# Patient Record
Sex: MALE | Race: BLACK OR AFRICAN AMERICAN | NOT HISPANIC OR LATINO | Employment: UNEMPLOYED | URBAN - METROPOLITAN AREA
[De-identification: names, ages, dates, MRNs, and addresses within clinical notes are randomized per-mention and may not be internally consistent; named-entity substitution may affect disease eponyms.]

---

## 2017-03-26 ENCOUNTER — HOSPITAL ENCOUNTER (EMERGENCY)
Facility: CLINIC | Age: 27
Discharge: HOME | End: 2017-03-26
Attending: EMERGENCY MEDICINE | Admitting: EMERGENCY MEDICINE

## 2017-03-26 VITALS
DIASTOLIC BLOOD PRESSURE: 93 MMHG | OXYGEN SATURATION: 99 % | SYSTOLIC BLOOD PRESSURE: 141 MMHG | RESPIRATION RATE: 16 BRPM | TEMPERATURE: 98.5 F | HEART RATE: 85 BPM

## 2017-03-26 DIAGNOSIS — R11.2 NAUSEA AND VOMITING, INTRACTABILITY OF VOMITING NOT SPECIFIED, UNSPECIFIED VOMITING TYPE: ICD-10-CM

## 2017-03-26 DIAGNOSIS — K29.70 GASTRITIS WITHOUT BLEEDING, UNSPECIFIED CHRONICITY, UNSPECIFIED GASTRITIS TYPE: ICD-10-CM

## 2017-03-26 LAB
ALBUMIN SERPL-MCNC: 4.6 G/DL (ref 3.4–5)
ALBUMIN UR-MCNC: NEGATIVE MG/DL
ALP SERPL-CCNC: 71 U/L (ref 40–150)
ALT SERPL W P-5'-P-CCNC: 58 U/L (ref 0–70)
ANION GAP SERPL CALCULATED.3IONS-SCNC: 12 MMOL/L (ref 3–14)
APPEARANCE UR: CLEAR
AST SERPL W P-5'-P-CCNC: 20 U/L (ref 0–45)
BASOPHILS # BLD AUTO: 0 10E9/L (ref 0–0.2)
BASOPHILS NFR BLD AUTO: 0.4 %
BILIRUB SERPL-MCNC: 0.8 MG/DL (ref 0.2–1.3)
BILIRUB UR QL STRIP: NEGATIVE
BUN SERPL-MCNC: 15 MG/DL (ref 7–30)
CALCIUM SERPL-MCNC: 9.3 MG/DL (ref 8.5–10.1)
CHLORIDE SERPL-SCNC: 105 MMOL/L (ref 94–109)
CO2 SERPL-SCNC: 24 MMOL/L (ref 20–32)
COLOR UR AUTO: YELLOW
CREAT SERPL-MCNC: 0.76 MG/DL (ref 0.66–1.25)
DIFFERENTIAL METHOD BLD: ABNORMAL
EOSINOPHIL # BLD AUTO: 0 10E9/L (ref 0–0.7)
EOSINOPHIL NFR BLD AUTO: 0.7 %
ERYTHROCYTE [DISTWIDTH] IN BLOOD BY AUTOMATED COUNT: 12 % (ref 10–15)
GFR SERPL CREATININE-BSD FRML MDRD: ABNORMAL ML/MIN/1.7M2
GLUCOSE SERPL-MCNC: 116 MG/DL (ref 70–99)
GLUCOSE UR STRIP-MCNC: NEGATIVE MG/DL
HCT VFR BLD AUTO: 49.4 % (ref 40–53)
HGB BLD-MCNC: 17.5 G/DL (ref 13.3–17.7)
HGB UR QL STRIP: NEGATIVE
HYALINE CASTS #/AREA URNS LPF: 1 /LPF (ref 0–2)
IMM GRANULOCYTES # BLD: 0 10E9/L (ref 0–0.4)
IMM GRANULOCYTES NFR BLD: 0.4 %
KETONES UR STRIP-MCNC: NEGATIVE MG/DL
LEUKOCYTE ESTERASE UR QL STRIP: NEGATIVE
LIPASE SERPL-CCNC: 208 U/L (ref 73–393)
LYMPHOCYTES # BLD AUTO: 2 10E9/L (ref 0.8–5.3)
LYMPHOCYTES NFR BLD AUTO: 36.2 %
MCH RBC QN AUTO: 30 PG (ref 26.5–33)
MCHC RBC AUTO-ENTMCNC: 35.4 G/DL (ref 31.5–36.5)
MCV RBC AUTO: 85 FL (ref 78–100)
MONOCYTES # BLD AUTO: 0.3 10E9/L (ref 0–1.3)
MONOCYTES NFR BLD AUTO: 5.9 %
MUCOUS THREADS #/AREA URNS LPF: PRESENT /LPF
NEUTROPHILS # BLD AUTO: 3.1 10E9/L (ref 1.6–8.3)
NEUTROPHILS NFR BLD AUTO: 56.4 %
NITRATE UR QL: NEGATIVE
NRBC # BLD AUTO: 0 10*3/UL
NRBC BLD AUTO-RTO: 0 /100
PH UR STRIP: 5 PH (ref 5–7)
PLATELET # BLD AUTO: 97 10E9/L (ref 150–450)
POTASSIUM SERPL-SCNC: 3.5 MMOL/L (ref 3.4–5.3)
PROT SERPL-MCNC: 8.6 G/DL (ref 6.8–8.8)
RBC # BLD AUTO: 5.84 10E12/L (ref 4.4–5.9)
RBC #/AREA URNS AUTO: 1 /HPF (ref 0–2)
SODIUM SERPL-SCNC: 141 MMOL/L (ref 133–144)
SP GR UR STRIP: 1.02 (ref 1–1.03)
URN SPEC COLLECT METH UR: ABNORMAL
UROBILINOGEN UR STRIP-MCNC: 0 MG/DL (ref 0–2)
WBC # BLD AUTO: 5.6 10E9/L (ref 4–11)
WBC #/AREA URNS AUTO: 1 /HPF (ref 0–2)

## 2017-03-26 PROCEDURE — 85025 COMPLETE CBC W/AUTO DIFF WBC: CPT | Performed by: EMERGENCY MEDICINE

## 2017-03-26 PROCEDURE — 83690 ASSAY OF LIPASE: CPT | Performed by: EMERGENCY MEDICINE

## 2017-03-26 PROCEDURE — S0028 INJECTION, FAMOTIDINE, 20 MG: HCPCS | Performed by: EMERGENCY MEDICINE

## 2017-03-26 PROCEDURE — 80053 COMPREHEN METABOLIC PANEL: CPT | Performed by: EMERGENCY MEDICINE

## 2017-03-26 PROCEDURE — 99284 EMERGENCY DEPT VISIT MOD MDM: CPT | Mod: 25

## 2017-03-26 PROCEDURE — 96361 HYDRATE IV INFUSION ADD-ON: CPT

## 2017-03-26 PROCEDURE — 25000125 ZZHC RX 250: Performed by: EMERGENCY MEDICINE

## 2017-03-26 PROCEDURE — 96374 THER/PROPH/DIAG INJ IV PUSH: CPT

## 2017-03-26 PROCEDURE — 81001 URINALYSIS AUTO W/SCOPE: CPT | Performed by: EMERGENCY MEDICINE

## 2017-03-26 PROCEDURE — 25000132 ZZH RX MED GY IP 250 OP 250 PS 637: Performed by: EMERGENCY MEDICINE

## 2017-03-26 PROCEDURE — 25000128 H RX IP 250 OP 636: Performed by: EMERGENCY MEDICINE

## 2017-03-26 RX ADMIN — FAMOTIDINE 20 MG: 10 INJECTION, SOLUTION INTRAVENOUS at 17:05

## 2017-03-26 RX ADMIN — SODIUM CHLORIDE 500 ML: 9 INJECTION, SOLUTION INTRAVENOUS at 17:05

## 2017-03-26 RX ADMIN — LIDOCAINE HYDROCHLORIDE 30 ML: 20 SOLUTION ORAL; TOPICAL at 17:02

## 2017-03-26 ASSESSMENT — ENCOUNTER SYMPTOMS
FEVER: 0
ABDOMINAL PAIN: 1
BLOOD IN STOOL: 0
VOMITING: 1
NAUSEA: 1
DYSURIA: 0
DIARRHEA: 0
SHORTNESS OF BREATH: 0

## 2017-03-26 NOTE — ED PROVIDER NOTES
"  History     Chief Complaint:  Nausea & Vomiting    HPI   Bridget Carmichael is a 26 year old male who presents with nausea, vomiting and abdominal pain. He states that over the weekend he experienced some upper abdominal discomfort accompanied with nausea. He vomited once today, though there was not any vomiting previous to this. There has been some decreased appetite, along with a mild headache that was alleviated with ibuprofen. There has not been any issues with fevers, blood in stool or vomit, nor any issues with urination. To note, he started that his symptoms began yesterday afternoon after eating lunch at his aunt's house.   Abdominal pain is \"slight\" and located in epigastric area.  No hematuria.    Allergies:  No Known Drug Allergies      Medications:    potassium chloride (K-DUR) 10 MEQ tablet   diphenoxylate-atropine (LOMOTIL) 2.5-0.025 MG per tablet     Past Medical History:    The patient denies any relevant past medical history.     Past Surgical History:    History reviewed. No pertinent past surgical history.     Family History:    The patient denies any relevant family medical history.     Social History:  The patient presented to the ED alone.   Smoking Status: N/A  Smokeless Tobacco: N/A  Alcohol Use: N/A   Marital Status:  Single [1]     Review of Systems   Constitutional: Negative for fever.   Respiratory: Negative for shortness of breath.    Gastrointestinal: Positive for abdominal pain, nausea and vomiting. Negative for blood in stool and diarrhea.   Genitourinary: Negative for dysuria.   All other systems reviewed and are negative.  Pt having nausea and abd discomfort over the weekend. Pt then vomited once today. Denies diarrhea.    Physical Exam   Vitals:  Patient Vitals for the past 24 hrs:   BP Temp Temp src Pulse Resp SpO2   03/26/17 1841 - - - - 16 -   03/26/17 1830 (!) 141/93 - - - - 99 %   03/26/17 1819 - - - - - 100 %   03/26/17 1818 (!) 144/92 - - - - -   03/26/17 1730 136/88 - - - - " 98 %   03/26/17 1715 - - - - - 99 %   03/26/17 1711 141/90 - - - - 99 %   03/26/17 1702 - 98.5  F (36.9  C) Oral - - -   03/26/17 1700 - - - - - 98 %   03/26/17 1634 (!) 165/112 - Oral 85 18 100 %     Physical Exam   Abdominal:         GEN: patient appears tired  HEAD: atraumatic, normocephalic  EYES: pupils reactive (3plus to 2plus), extraocular muscles intact, conjunctivae normal  ENT: TMs flat and white bilaterally, oropharynx normal with no erythema or exudate, mucus membranes moist  NECK: no cervical LAD, no meningeal signs  RESPIRATORY: no tachypnea, breath sounds clear to auscultation (no rales, wheezes, rhonchi), chest wall nontender, normal phonation  CVS: normal S1/S2, no murmurs/rubs/gallops  ABDOMEN: soft, slight tenderness epigastric area, no masses or organomegaly, no rebound, decreased bowel sounds  BACK: no CVAT  EXTREMITIES: intact pulses x 2 (radial pulses intact), no edema  MUSCULOSKELETAL: no deformities  SKIN: warm and dry  NEURO: Alert.  Motor- moves all 4 extremities.  Sensation- intact  Reflexes- DTRs 2plus.  Coordination-ambulatory.  Overall symmetrical exam  HEME: no bruising or petechiae/contusions  LYMPH: no lymphadenopathy    Emergency Department Course     Laboratory:  Laboratory findings were communicated with the patient who voiced understanding of the findings.  CBC: PLT: 97 (H) o/w WNL. (WBC 5.6, HGB 17.5)   CMP: Glucose: 116 (H) o/w WNL (Creatinine 0.76)  Lipase: 208 (normal)  UA: Mucous: Present (A) o/w normal     Interventions:  1702 GI Cocktail (Maalox/Mylanta and viscous Lidocaine), 30 mL suspension, PO    1705 Pepcid 20 mg IV  1705 Normal Saline 500 mL IV   Heplock  Cardiac/Sp02 monitoring    Emergency Department Course:  Nursing notes and vitals reviewed.  I performed an exam of the patient as documented above.  IV was inserted and blood was drawn for laboratory testing, results above.   The patient provided a urine sample here in the emergency department. This was sent for  laboratory testing, findings above.   1810 The patient was updated on the results of their lab tests. The patient states that they are feeling improved.     6:24 PM recheck, repeat ab exam soft    I discussed the treatment plan with the patient. They expressed understanding of this plan and consented to discharge. They will be discharged home with instructions for care and follow up. In addition, the patient will return to the emergency department if their symptoms persist, worsen, if new symptoms arise or if there is any concern.  All questions were answered.    I personally reviewed the laboratory results with the Patient and answered all related questions prior to discharge.    Impression & Plan      Medical Decision Making:  Bridget Carmichael is a 26 year old male who presents to the emergency department today with abdominal pain. He states that after he ate lunch yesterday he had some epigastric pain and diarrhea. On exam, he did not have peritoneal signs. Labs were sent and he was given antiacid through the IV in addition to fluids and nausea medications. He currently is feeling much better. His UA foes not show any sign of infection. His CBC is normal. Lipase does not show evidence of pancreatitis. Patient feels better and he tolerated PO. We will send him home with antiacids and he should follow up with primary care.   Repeat abdominal exam is soft and patient is tolerating a PO challenge.    Diagnosis:    ICD-10-CM    1. Nausea and vomiting, intractability of vomiting not specified, unspecified vomiting type R11.2    2. Gastritis without bleeding, unspecified chronicity, unspecified gastritis type K29.70       Disposition:   Discharge to home    Discharge Medications:  Discharge Medication List as of 3/26/2017  6:27 PM      START taking these medications    Details   ranitidine (ZANTAC) 300 MG tablet Take 0.5 tablets (150 mg) by mouth At Bedtime, Disp-30 tablet, R-0, Local Print           Instructions to  patient:  Sit up after eating.  [place] diet.   Avoid spicy or fatty foods.  Avoid smoking or carbonated beverages.  Use the ant-acid medication every day.  Maalox or pepto bismol for break through pain/gastritis.  Followup with your doctor in 1-2days.    Scribe Disclosure:  I, Tyrone Stallings, am serving as a scribe at 4:33 PM on 3/26/2017 to document services personally performed by Geovanna Pinto MD, based on my observations and the provider's statements to me.   3/26/2017   [facility] EMERGENCY DEPARTMENT       Geovanna Pinto MD  03/27/17 7540

## 2017-03-26 NOTE — ED NOTES
Pt having nausea and abd discomfort over the weekend. Pt then vomited once today. Denies diarrhea.

## 2017-03-26 NOTE — ED AVS SNAPSHOT
[facility] Emergency Department    [address]    [facility] [address]    Phone:  [phone]    Fax:  [phone]                                       Bridget Carmichael   MRN: [id number]    Department:  [facility] Emergency Department   Date of Visit:  3/26/2017           After Visit Summary Signature Page     I have received my discharge instructions, and my questions have been answered. I have discussed any challenges I see with this plan with the nurse or doctor.    ..........................................................................................................................................  Patient/Patient Representative Signature      ..........................................................................................................................................  Patient Representative Print Name and Relationship to Patient    ..................................................               ................................................  Date                                            Time    ..........................................................................................................................................  Reviewed by Signature/Title    ...................................................              ..............................................  Date                                                            Time

## 2017-03-26 NOTE — LETTER
[facility] EMERGENCY DEPARTMENT  [address] [place] [address]  [phone]    Bridget Carmichael  [address] [facility] [address] [facility] [address]  [phone] (home) none (work)    : 1990      To Whom it may concern:    Bridget Carmichael was seen in our Emergency Department today, 2017, for an illness he has had for the past few days.  We expect his condition to improve over the next 1-2 days.  He may return to work/school when improved.    Sincerely,        Colette Gregorio RN

## 2017-03-26 NOTE — ED AVS SNAPSHOT
[facility] Emergency Department    [address]    [facility] [address]    Phone:  [phone]    Fax:  [phone]                                       Bridget Carmichael   MRN: [id number]    Department:  [facility] Emergency Department   Date of Visit:  3/26/2017           Patient Information     Date Of Birth          1990        Your diagnoses for this visit were:     Nausea and vomiting, intractability of vomiting not specified, unspecified vomiting type     Gastritis without bleeding, unspecified chronicity, unspecified gastritis type        You were seen by Geovanna Pinto MD.      Follow-up Information     Follow up with [facility].    Contact information:    [address] [facility] [address] [phone]          Discharge Instructions       Sit up after eating.  [place] diet.   Avoid spicy or fatty foods.  Avoid smoking or carbonated beverages.  Use the ant-acid medication every day.  Maalox or pepto bismol for break through pain/gastritis.  Followup with your doctor in 1-2days.      Discharge References/Attachments     GASTRITIS (ADULT) (ENGLISH)      24 Hour Appointment Hotline       To make an appointment at any [facility] clinic, call [phone] ([phone]). If you don't have a family doctor or clinic, we will help you find one. [facility] clinics are conveniently located to serve the needs of you and your family.             Review of your medicines      START taking        Dose / Directions Last dose taken    ranitidine 300 MG tablet   Commonly known as:  ZANTAC   Dose:  150 mg   Quantity:  30 tablet        Take 0.5 tablets (150 mg) by mouth At Bedtime   Refills:  0                Prescriptions were sent or printed at these locations (1 Prescription)                   Other Prescriptions                Printed at Department/Unit printer (1 of 1)         ranitidine (ZANTAC) 300 MG tablet                Procedures and tests  performed during your visit     CBC with platelets differential    Comprehensive metabolic panel    Lipase    Peripheral IV catheter    UA with Microscopic    Vital signs      Orders Needing Specimen Collection     None      Pending Results     No orders found from 3/24/2017 to 3/27/2017.            Pending Culture Results     No orders found from 3/24/2017 to 3/27/2017.             Test Results from your hospital stay     3/26/2017  4:56 PM - Interface, Flexilab Results      Component Results     Component Value Ref Range & Units Status    WBC 5.6 4.0 - 11.0 10e9/L Final    RBC Count 5.84 4.4 - 5.9 10e12/L Final    Hemoglobin 17.5 13.3 - 17.7 g/dL Final    Hematocrit 49.4 40.0 - 53.0 % Final    MCV 85 78 - 100 fl Final    MCH 30.0 26.5 - 33.0 pg Final    MCHC 35.4 31.5 - 36.5 g/dL Final    RDW 12.0 10.0 - 15.0 % Final    Platelet Count 97 (L) 150 - 450 10e9/L Final    Diff Method Automated Method  Final    % Neutrophils 56.4 % Final    % Lymphocytes 36.2 % Final    % Monocytes 5.9 % Final    % Eosinophils 0.7 % Final    % Basophils 0.4 % Final    % Immature Granulocytes 0.4 % Final    Nucleated RBCs 0 0 /100 Final    Absolute Neutrophil 3.1 1.6 - 8.3 10e9/L Final    Absolute Lymphocytes 2.0 0.8 - 5.3 10e9/L Final    Absolute Monocytes 0.3 0.0 - 1.3 10e9/L Final    Absolute Eosinophils 0.0 0.0 - 0.7 10e9/L Final    Absolute Basophils 0.0 0.0 - 0.2 10e9/L Final    Abs Immature Granulocytes 0.0 0 - 0.4 10e9/L Final    Absolute Nucleated RBC 0.0  Final         3/26/2017  5:13 PM - Interface, Flexilab Results      Component Results     Component Value Ref Range & Units Status    Sodium 141 133 - 144 mmol/L Final    Potassium 3.5 3.4 - 5.3 mmol/L Final    Chloride 105 94 - 109 mmol/L Final    Carbon Dioxide 24 20 - 32 mmol/L Final    Anion Gap 12 3 - 14 mmol/L Final    Glucose 116 (H) 70 - 99 mg/dL Final    Urea Nitrogen 15 7 - 30 mg/dL Final    Creatinine 0.76 0.66 - 1.25 mg/dL Final    GFR Estimate >90  Non African  American GFR Calc   >60 mL/min/1.7m2 Final    GFR Estimate If Black >90   GFR Calc   >60 mL/min/1.7m2 Final    Calcium 9.3 8.5 - 10.1 mg/dL Final    Bilirubin Total 0.8 0.2 - 1.3 mg/dL Final    Albumin 4.6 3.4 - 5.0 g/dL Final    Protein Total 8.6 6.8 - 8.8 g/dL Final    Alkaline Phosphatase 71 40 - 150 U/L Final    ALT 58 0 - 70 U/L Final    AST 20 0 - 45 U/L Final         3/26/2017  5:13 PM - Interface, Flexilab Results      Component Results     Component Value Ref Range & Units Status    Lipase 208 73 - 393 U/L Final         3/26/2017  5:58 PM - Interface, Flexilab Results      Component Results     Component Value Ref Range & Units Status    Color Urine Yellow  Final    Appearance Urine Clear  Final    Glucose Urine Negative NEG mg/dL Final    Bilirubin Urine Negative NEG Final    Ketones Urine Negative NEG mg/dL Final    Specific Gravity Urine 1.024 1.003 - 1.035 Final    Blood Urine Negative NEG Final    pH Urine 5.0 5.0 - 7.0 pH Final    Protein Albumin Urine Negative NEG mg/dL Final    Urobilinogen mg/dL 0.0 0.0 - 2.0 mg/dL Final    Nitrite Urine Negative NEG Final    Leukocyte Esterase Urine Negative NEG Final    Source Midstream Urine  Final    WBC Urine 1 0 - 2 /HPF Final    RBC Urine 1 0 - 2 /HPF Final    Mucous Urine Present (A) NEG /LPF Final    Hyaline Casts 1 0 - 2 /LPF Final                Clinical Quality Measure: Blood Pressure Screening     Your blood pressure was checked while you were in the emergency department today. The last reading we obtained was  BP: 136/88 . Please read the guidelines below about what these numbers mean and what you should do about them.  If your systolic blood pressure (the top number) is less than 120 and your diastolic blood pressure (the bottom number) is less than 80, then your blood pressure is normal. There is nothing more that you need to do about it.  If your systolic blood pressure (the top number) is 120-139 or your diastolic blood pressure  "(the bottom number) is 80-89, your blood pressure may be higher than it should be. You should have your blood pressure rechecked within a year by a primary care provider.  If your systolic blood pressure (the top number) is 140 or greater or your diastolic blood pressure (the bottom number) is 90 or greater, you may have high blood pressure. High blood pressure is treatable, but if left untreated over time it can put you at risk for heart attack, stroke, or kidney failure. You should have your blood pressure rechecked by a primary care provider within the next 4 weeks.  If your provider in the emergency department today gave you specific instructions to follow-up with your doctor or provider even sooner than that, you should follow that instruction and not wait for up to 4 weeks for your follow-up visit.        Thank you for choosing [facility]       Thank you for choosing [facility] for your care. Our goal is always to provide you with excellent care. Hearing back from our patients is one way we can continue to improve our services. Please take a few minutes to complete the written survey that you may receive in the mail after you visit with us. Thank you!        [employer] Information     [employer] lets you send messages to your doctor, view your test results, renew your prescriptions, schedule appointments and more. To sign up, go to [email or web address] . Click on \"Log in\" on the left side of the screen, which will take you to the Welcome page. Then click on \"Sign up Now\" on the right side of the page.     You will be asked to enter the access code listed below, as well as some personal information. Please follow the directions to create your username and password.     Your access code is: [email or web address]  Expires: 2017  6:25 PM     Your access code will  in 90 days. If you need help or a new code, please call your [facility] clinic or [phone].        Care EveryWhere ID     This is your Care EveryWhere ID. " This could be used by other organizations to access your [facility] medical records  [id number]        After Visit Summary       This is your record. Keep this with you and show to your community pharmacist(s) and doctor(s) at your next visit.

## 2017-03-26 NOTE — DISCHARGE INSTRUCTIONS
Sit up after eating.  [place] diet.   Avoid spicy or fatty foods.  Avoid smoking or carbonated beverages.  Use the ant-acid medication every day.  Maalox or pepto bismol for break through pain/gastritis.  Followup with your doctor in 1-2days.

## 2019-01-28 ENCOUNTER — OFFICE VISIT (OUTPATIENT)
Dept: FAMILY MEDICINE | Facility: CLINIC | Age: 29
End: 2019-01-28

## 2020-12-04 ENCOUNTER — HOSPITAL ENCOUNTER (EMERGENCY)
Facility: CLINIC | Age: 30
Discharge: HOME | End: 2020-12-04
Attending: EMERGENCY MEDICINE | Admitting: EMERGENCY MEDICINE
Payer: COMMERCIAL

## 2020-12-04 VITALS
HEART RATE: 94 BPM | SYSTOLIC BLOOD PRESSURE: 147 MMHG | OXYGEN SATURATION: 98 % | DIASTOLIC BLOOD PRESSURE: 95 MMHG | TEMPERATURE: 99.2 F | RESPIRATION RATE: 18 BRPM

## 2020-12-04 DIAGNOSIS — F10.20 ALCOHOL USE DISORDER, MODERATE, DEPENDENCE (H): ICD-10-CM

## 2020-12-04 LAB
ALCOHOL BREATH TEST: 0 (ref 0–0.01)
AMPHETAMINES UR QL SCN: NEGATIVE
BARBITURATES UR QL: NEGATIVE
BENZODIAZ UR QL: NEGATIVE
CANNABINOIDS UR QL SCN: NEGATIVE
COCAINE UR QL: NEGATIVE
ETHANOL UR QL SCN: NEGATIVE
GLUCOSE BLDC GLUCOMTR-MCNC: 137 MG/DL (ref 70–99)
INTERPRETATION ECG - MUSE: NORMAL
OPIATES UR QL SCN: NEGATIVE

## 2020-12-04 PROCEDURE — 80307 DRUG TEST PRSMV CHEM ANLYZR: CPT | Performed by: EMERGENCY MEDICINE

## 2020-12-04 PROCEDURE — 93005 ELECTROCARDIOGRAM TRACING: CPT | Performed by: EMERGENCY MEDICINE

## 2020-12-04 PROCEDURE — 99283 EMERGENCY DEPT VISIT LOW MDM: CPT | Performed by: EMERGENCY MEDICINE

## 2020-12-04 PROCEDURE — 999N001017 HC STATISTIC GLUCOSE BY METER IP

## 2020-12-04 PROCEDURE — 80320 DRUG SCREEN QUANTALCOHOLS: CPT | Performed by: EMERGENCY MEDICINE

## 2020-12-04 NOTE — ED TRIAGE NOTES
Patient comes here voluntarily. Patient reports that his diabetic.( Patient has been in excess for months. 6-7 shot of rum a day. Last drink was yesterday.

## 2020-12-04 NOTE — ED PROVIDER NOTES
ED Provider Note  [facility]      History     Chief Complaint   Patient presents with     Alcohol Problem     Last drink Rum 6/7 shots     HPI  Bridget Carmichael is a 30 year old male who presents asking for alcohol detox.    He last drank 10pm last night  Drinks 6-7 shots every other day for the past few years  Experiences tremulousness, anxiety when withdrawing  Has never had withdrawal seizures before  Has tried to detox at home before but gets worried he's not doing it right and urges are too strong.  Only recreational drug use was trying meth for the first time in Sept    The patient thinks that underlying anxiety makes his drinking worse.    Has tried AA recently which he liked. Has not tried any outpatient treatment programs due to cost. Patient has no health insurance.      He was diagnosed with DMII in September and takes Metformin daily. Was prescribed Lantus but doesn't take it. Checks his sugars infrequently.       Review of Systems  A complete review of systems was performed with pertinent positives and negatives noted in the HPI, and all other systems negative.    Physical Exam   BP: (!) 162/101  Pulse: 120  Temp: 98.3  F (36.8  C)  Resp: 16  SpO2: 99 %  Physical Exam  Vitals signs reviewed.   Constitutional:       Appearance: Normal appearance. He is not diaphoretic.      Comments: Patient does not appear to be inebriated   HENT:      Head: Normocephalic.   Eyes:      Conjunctiva/sclera: Conjunctivae normal.      Pupils: Pupils are equal, round, and reactive to light.   Neck:      Musculoskeletal: Normal range of motion.   Cardiovascular:      Rate and Rhythm: Normal rate and regular rhythm.      Pulses: Normal pulses.      Heart sounds: Normal heart sounds.   Pulmonary:      Effort: Pulmonary effort is normal.      Breath sounds: Normal breath sounds. No wheezing, rhonchi or rales.   Abdominal:      General: Abdomen is flat.      Palpations: Abdomen is soft. There is no  mass.      Tenderness: There is no abdominal tenderness.   Musculoskeletal: Normal range of motion.   Skin:     General: Skin is warm and dry.   Neurological:      Mental Status: He is alert and oriented to person, place, and time.      Comments: No tremulousness, shaking   Psychiatric:         Mood and Affect: Mood normal.         Behavior: Behavior normal.         Thought Content: Thought content normal.         Judgment: Judgment normal.       ED Course      Procedures         EKG Interpretation:      Interpreted by Maricruz Nelson DO  Time reviewed: 11am  Symptoms at time of EKG: potential alcohol withdrawal  Rhythm: normal sinus   Rate: normal  Axis: normal  Ectopy: none  Conduction: normal  ST Segments/ T Waves: No ST-T wave changes  Q Waves: none  Comparison to prior: No old EKG available    Clinical Impression: normal EKG       Results for orders placed or performed during the hospital encounter of 12/04/20   Drug abuse screen 6 urine (tox)     Status: None   Result Value Ref Range    Amphetamine Qual Urine Negative NEG^Negative    Barbiturates Qual Urine Negative NEG^Negative    Benzodiazepine Qual Urine Negative NEG^Negative    Cannabinoids Qual Urine Negative NEG^Negative    Cocaine Qual Urine Negative NEG^Negative    Ethanol Qual Urine Negative NEG^Negative    Opiates Qualitative Urine Negative NEG^Negative   Glucose by meter     Status: Abnormal   Result Value Ref Range    Glucose 137 (H) 70 - 99 mg/dL   EKG 12-lead, tracing only     Status: None (Preliminary result)   Result Value Ref Range    Interpretation ECG Click View Image link to view waveform and result    Alcohol breath test POCT     Status: Normal   Result Value Ref Range    Alcohol Breath Test 0.000 0.00 - 0.01     Medications - No data to display     Assessments & Plan (with Medical Decision Making)   Bridget Carmichael is a 30 year old male who presented to the ED asking for alcohol detox. HE has no history of alcohol withdrawal seizures or  delirium tremens. He reports that last use was 10pm last night but his alcohol breath test was 0.0. Denies any other recent substance use and urine tox was negative. EKG was normal sinus rhythm and patient was asymptomatic.     Through our conversation, it became clear that the patient did not require medical level care and would be appropriate for outpatient addiction treatment. We provided him with information for Rule 25, as he does not have insurance and cannot pay out of pocket for treatment. Also recommended he follow-up with PCP to discuss anti-anxiety medication as it appears his baseline anxiety often leads him to drink. He ate lunch and was medically cleared to return home with family.    I have reviewed the nursing notes. I have reviewed the findings, diagnosis, plan and need for follow up with the patient.    New Prescriptions    No medications on file       Final diagnoses:   Alcohol use disorder, moderate, dependence (H)     Maricruz Nelson DO   she/her/hers   PGY-1  p [phone]    --    This data collected with the Resident working in the Emergency Department.  Patient was seen and evaluated by myself and I repeated the history and physical exam with the patient.  The plan of care was discussed with them.  The key portions of the note including the entire assessment and plan reflect my documentation.        Jasbir Boyer [facility] EMERGENCY DEPARTMENT  12/4/2020     Jasbir Thomas DO  12/04/20 1310

## 2020-12-04 NOTE — DISCHARGE INSTRUCTIONS
Use information given to you today to file Rule-25 to help qualify you for outpatient alcohol treatment programs.    Please make an appointment to follow up with Your Primary Care Provider in 7-10 days to discuss anxiety management.

## 2020-12-04 NOTE — ED AVS SNAPSHOT
[facility]  [address]  [place] [address]  Phone: [phone]  Fax: [phone]                                    Bridget Carmichael   MRN: [id number]    Department: [facility] Emergency Department   Date of Visit: 12/4/2020           After Visit Summary Signature Page    I have received my discharge instructions, and my questions have been answered. I have discussed any challenges I see with this plan with the nurse or doctor.    ..........................................................................................................................................  Patient/Patient Representative Signature      ..........................................................................................................................................  Patient Representative Print Name and Relationship to Patient    ..................................................               ................................................  Date                                   Time    ..........................................................................................................................................  Reviewed by Signature/Title    ...................................................              ..............................................  Date                                               Time          22EPIC Rev 08/18

## 2020-12-20 ENCOUNTER — HEALTH MAINTENANCE LETTER (OUTPATIENT)
Age: 30
End: 2020-12-20

## 2021-10-03 ENCOUNTER — HEALTH MAINTENANCE LETTER (OUTPATIENT)
Age: 31
End: 2021-10-03

## 2022-01-23 ENCOUNTER — HEALTH MAINTENANCE LETTER (OUTPATIENT)
Age: 32
End: 2022-01-23

## 2022-08-17 ENCOUNTER — TELEPHONE (OUTPATIENT)
Dept: BEHAVIORAL HEALTH | Facility: CLINIC | Age: 32
End: 2022-08-17

## 2022-08-17 ENCOUNTER — HOSPITAL ENCOUNTER (INPATIENT)
Facility: CLINIC | Age: 32
LOS: 1 days | Discharge: HOME | End: 2022-08-19
Attending: EMERGENCY MEDICINE | Admitting: PSYCHIATRY & NEUROLOGY
Payer: COMMERCIAL

## 2022-08-17 DIAGNOSIS — E11.9 TYPE 2 DIABETES MELLITUS WITHOUT COMPLICATION, WITHOUT LONG-TERM CURRENT USE OF INSULIN (H): ICD-10-CM

## 2022-08-17 DIAGNOSIS — Z20.822 CONTACT WITH AND (SUSPECTED) EXPOSURE TO COVID-19: ICD-10-CM

## 2022-08-17 DIAGNOSIS — E78.1 HYPERTRIGLYCERIDEMIA: Primary | ICD-10-CM

## 2022-08-17 DIAGNOSIS — Z79.84 LONG TERM (CURRENT) USE OF ORAL HYPOGLYCEMIC DRUGS: ICD-10-CM

## 2022-08-17 DIAGNOSIS — F10.229 ALCOHOL DEPENDENCE WITH INTOXICATION WITH COMPLICATION (H): ICD-10-CM

## 2022-08-17 LAB
ALBUMIN SERPL-MCNC: 4.4 G/DL (ref 3.4–5)
ALCOHOL BREATH TEST: 0.36 (ref 0–0.01)
ALP SERPL-CCNC: 64 U/L (ref 40–150)
ALT SERPL W P-5'-P-CCNC: 33 U/L (ref 0–70)
ANION GAP SERPL CALCULATED.3IONS-SCNC: 11 MMOL/L (ref 3–14)
AST SERPL W P-5'-P-CCNC: 19 U/L (ref 0–45)
BASOPHILS # BLD AUTO: 0 10E3/UL (ref 0–0.2)
BASOPHILS NFR BLD AUTO: 1 %
BILIRUB SERPL-MCNC: 0.4 MG/DL (ref 0.2–1.3)
BUN SERPL-MCNC: 8 MG/DL (ref 7–30)
CALCIUM SERPL-MCNC: 9.4 MG/DL (ref 8.5–10.1)
CHLORIDE BLD-SCNC: 103 MMOL/L (ref 94–109)
CO2 SERPL-SCNC: 27 MMOL/L (ref 20–32)
CREAT SERPL-MCNC: 0.52 MG/DL (ref 0.66–1.25)
EOSINOPHIL # BLD AUTO: 0 10E3/UL (ref 0–0.7)
EOSINOPHIL NFR BLD AUTO: 0 %
ERYTHROCYTE [DISTWIDTH] IN BLOOD BY AUTOMATED COUNT: 12.8 % (ref 10–15)
GFR SERPL CREATININE-BSD FRML MDRD: >90 ML/MIN/1.73M2
GLUCOSE BLD-MCNC: 275 MG/DL (ref 70–99)
HCT VFR BLD AUTO: 47.1 % (ref 40–53)
HGB BLD-MCNC: 16.9 G/DL (ref 13.3–17.7)
IMM GRANULOCYTES # BLD: 0 10E3/UL
IMM GRANULOCYTES NFR BLD: 1 %
LYMPHOCYTES # BLD AUTO: 2.2 10E3/UL (ref 0.8–5.3)
LYMPHOCYTES NFR BLD AUTO: 46 %
MCH RBC QN AUTO: 31.1 PG (ref 26.5–33)
MCHC RBC AUTO-ENTMCNC: 35.9 G/DL (ref 31.5–36.5)
MCV RBC AUTO: 87 FL (ref 78–100)
MONOCYTES # BLD AUTO: 0.2 10E3/UL (ref 0–1.3)
MONOCYTES NFR BLD AUTO: 4 %
NEUTROPHILS # BLD AUTO: 2.3 10E3/UL (ref 1.6–8.3)
NEUTROPHILS NFR BLD AUTO: 48 %
NRBC # BLD AUTO: 0 10E3/UL
NRBC BLD AUTO-RTO: 0 /100
PLATELET # BLD AUTO: 103 10E3/UL (ref 150–450)
POTASSIUM BLD-SCNC: 3.7 MMOL/L (ref 3.4–5.3)
PROT SERPL-MCNC: 8.6 G/DL (ref 6.8–8.8)
RBC # BLD AUTO: 5.43 10E6/UL (ref 4.4–5.9)
SARS-COV-2 RNA RESP QL NAA+PROBE: NEGATIVE
SODIUM SERPL-SCNC: 141 MMOL/L (ref 133–144)
WBC # BLD AUTO: 4.7 10E3/UL (ref 4–11)

## 2022-08-17 PROCEDURE — 250N000013 HC RX MED GY IP 250 OP 250 PS 637: Performed by: FAMILY MEDICINE

## 2022-08-17 PROCEDURE — 83036 HEMOGLOBIN GLYCOSYLATED A1C: CPT | Performed by: INTERNAL MEDICINE

## 2022-08-17 PROCEDURE — 36415 COLL VENOUS BLD VENIPUNCTURE: CPT | Performed by: FAMILY MEDICINE

## 2022-08-17 PROCEDURE — 99285 EMERGENCY DEPT VISIT HI MDM: CPT | Performed by: FAMILY MEDICINE

## 2022-08-17 PROCEDURE — 80053 COMPREHEN METABOLIC PANEL: CPT | Performed by: FAMILY MEDICINE

## 2022-08-17 PROCEDURE — 85014 HEMATOCRIT: CPT | Performed by: FAMILY MEDICINE

## 2022-08-17 PROCEDURE — U0003 INFECTIOUS AGENT DETECTION BY NUCLEIC ACID (DNA OR RNA); SEVERE ACUTE RESPIRATORY SYNDROME CORONAVIRUS 2 (SARS-COV-2) (CORONAVIRUS DISEASE [COVID-19]), AMPLIFIED PROBE TECHNIQUE, MAKING USE OF HIGH THROUGHPUT TECHNOLOGIES AS DESCRIBED BY CMS-2020-01-R: HCPCS | Performed by: FAMILY MEDICINE

## 2022-08-17 PROCEDURE — HZ2ZZZZ DETOXIFICATION SERVICES FOR SUBSTANCE ABUSE TREATMENT: ICD-10-PCS | Performed by: PSYCHIATRY & NEUROLOGY

## 2022-08-17 PROCEDURE — C9803 HOPD COVID-19 SPEC COLLECT: HCPCS | Performed by: FAMILY MEDICINE

## 2022-08-17 PROCEDURE — 99284 EMERGENCY DEPT VISIT MOD MDM: CPT | Performed by: FAMILY MEDICINE

## 2022-08-17 PROCEDURE — 82075 ASSAY OF BREATH ETHANOL: CPT | Performed by: FAMILY MEDICINE

## 2022-08-17 RX ORDER — INSULIN GLARGINE 100 [IU]/ML
20 INJECTION, SOLUTION SUBCUTANEOUS AT BEDTIME
Status: ON HOLD | COMMUNITY
Start: 2022-06-26 | End: 2022-08-19

## 2022-08-17 RX ORDER — METFORMIN HCL 500 MG
TABLET, EXTENDED RELEASE 24 HR ORAL
COMMUNITY
Start: 2022-05-28

## 2022-08-17 RX ORDER — MULTIPLE VITAMINS W/ MINERALS TAB 9MG-400MCG
1 TAB ORAL DAILY
Status: DISCONTINUED | OUTPATIENT
Start: 2022-08-17 | End: 2022-08-18

## 2022-08-17 RX ORDER — DIAZEPAM 5 MG
5-20 TABLET ORAL EVERY 30 MIN PRN
Status: DISCONTINUED | OUTPATIENT
Start: 2022-08-17 | End: 2022-08-18

## 2022-08-17 RX ORDER — FOLIC ACID 1 MG/1
1 TABLET ORAL DAILY
Status: DISCONTINUED | OUTPATIENT
Start: 2022-08-17 | End: 2022-08-18

## 2022-08-17 RX ADMIN — DIAZEPAM 10 MG: 5 TABLET ORAL at 20:00

## 2022-08-17 RX ADMIN — DIAZEPAM 10 MG: 5 TABLET ORAL at 21:22

## 2022-08-17 RX ADMIN — DIAZEPAM 10 MG: 5 TABLET ORAL at 22:39

## 2022-08-17 ASSESSMENT — ACTIVITIES OF DAILY LIVING (ADL)
ADLS_ACUITY_SCORE: 35

## 2022-08-17 NOTE — TELEPHONE ENCOUNTER
S: Sarah, [place] [facility], 31/M, alcohol detox     B: Pt reports drinking consistently, unable to quantify, for a long time, breath .360, JOSIAH prior to arrival   No hx of sz or DTs  No active w/d concerns yet  Pt acute med or MH concerns   Ongoing med: diabetes, metformin, no insulin, controlled     Medically cleared, eating, drinking, ambulating indep  Patient cleared and ready for behavioral bed placement: Yes   covid neg     A: Voluntary     R: ZHANNA/SVETLANA/Levi     507pm - Levi, on call provider, paged   511pm - Levi accepts   Pt placed in queue   517pm - unit charge notified, will call ED for report when staffing allows   518pm - ED charge notified via text page

## 2022-08-17 NOTE — ED PROVIDER NOTES
[facility] EMERGENCY DEPARTMENT ([facility])     August 17, 2022     History     Chief Complaint   Patient presents with     Alcohol Intoxication     HPI  Bridget Carmichael is a 31 year old male with a past medical history including alcohol use disorder, acute alcoholic intoxication with complication, lactic acidosis, DM2, hyperglycemia who presents to the Emergency Department for evaluation of alcohol intoxication.  Patient was brought in by his mother because he has been drinking for many days in a row now.  He states that he has been drinking consistently each day and that he also takes metformin for diabetes.  His mother states he was brought into [facility] twice and that this is the third time he has been intoxicated that she has had to bring him in.  He states that he fell in the elevator.  He does state that he has a history of withdrawal but denies history of withdrawal seizures or hallucinations.  He has blood on his shirt and face from a scratch on his right hand.  Denies back or abdominal pain.      Past Medical History  Past Medical History:   Diagnosis Date     Anxiety      Diabetes (H)      No past surgical history on file.  METFORMIN HCL PO      No Known Allergies  Past medical history, past surgical history, medications, and allergies were reviewed with the patient. Additional pertinent items:  alcohol use disorder, acute alcoholic intoxication with complication, lactic acidosis, DM2, hyperglycemia retrieved from Care Everywhere.    Family History  Family History   Problem Relation Age of Onset     Diabetes Mother      Family history was reviewed with the patient. Additional pertinent items: None    Social History  Social History     Tobacco Use     Smoking status: Never Smoker   Substance Use Topics     Alcohol use: Yes     Alcohol/week: 42.0 standard drinks     Types: 42 Shots of liquor per week     Comment: 6-7 shots of liquor a day as of 12/4/2020     Drug use: No      Social history was  reviewed with the patient. Additional pertinent items: None      Review of Systems  A complete review of systems was performed with pertinent positives and negatives noted in the HPI, and all other systems negative.    Physical Exam   BP: (!) 140/101  Pulse: (!) 130  Temp: 98  F (36.7  C)  Resp: 22  Weight: 62.1 kg (136 lb 14.5 oz)  SpO2: 99 %  Physical Exam  Vitals and nursing note reviewed.   Constitutional:       General: He is not in acute distress.     Appearance: He is not diaphoretic.   HENT:      Head: Atraumatic.   Eyes:      General: No scleral icterus.     Pupils: Pupils are equal, round, and reactive to light.   Cardiovascular:      Rate and Rhythm: Regular rhythm. Tachycardia present.      Heart sounds: Normal heart sounds. No murmur heard.  Pulmonary:      Effort: No respiratory distress.      Breath sounds: Normal breath sounds.   Abdominal:      General: Bowel sounds are normal.      Palpations: Abdomen is soft.      Tenderness: There is no abdominal tenderness.   Musculoskeletal:         General: No tenderness.        Arms:       Cervical back: Neck supple. No tenderness.   Skin:     General: Skin is warm.      Findings: No rash.   Neurological:      General: No focal deficit present.      Mental Status: He is oriented to person, place, and time. Mental status is at baseline.   Psychiatric:         Attention and Perception: Attention normal.         Speech: Speech is delayed and slurred (Consistent with alcohol intoxication).         Behavior: Behavior is cooperative.         Thought Content: Thought content normal.         ED Course     2:45 PM  The patient was seen and examined by Ward Smith MD in Room HW03.    Procedures       The medical record was reviewed and interpreted.  Current labs reviewed and interpreted.  Previous labs reviewed and interpreted.              Results for orders placed or performed during the hospital encounter of 08/17/22   Comprehensive metabolic panel     Status:  Abnormal   Result Value Ref Range    Sodium 141 133 - 144 mmol/L    Potassium 3.7 3.4 - 5.3 mmol/L    Chloride 103 94 - 109 mmol/L    Carbon Dioxide (CO2) 27 20 - 32 mmol/L    Anion Gap 11 3 - 14 mmol/L    Urea Nitrogen 8 7 - 30 mg/dL    Creatinine 0.52 (L) 0.66 - 1.25 mg/dL    Calcium 9.4 8.5 - 10.1 mg/dL    Glucose 275 (H) 70 - 99 mg/dL    Alkaline Phosphatase 64 40 - 150 U/L    AST 19 0 - 45 U/L    ALT 33 0 - 70 U/L    Protein Total 8.6 6.8 - 8.8 g/dL    Albumin 4.4 3.4 - 5.0 g/dL    Bilirubin Total 0.4 0.2 - 1.3 mg/dL    GFR Estimate >90 >60 mL/min/1.73m2   Asymptomatic COVID-19 Virus (Coronavirus) by PCR Nasopharyngeal     Status: Normal    Specimen: Nasopharyngeal; Swab   Result Value Ref Range    SARS CoV2 PCR Negative Negative    Narrative    Testing was performed using the pablo  SARS-CoV-2 & Influenza A/B Assay on the pablo  Britta  System.  This test should be ordered for the detection of SARS-COV-2 in individuals who meet SARS-CoV-2 clinical and/or epidemiological criteria. Test performance is unknown in asymptomatic patients.  This test is for in vitro diagnostic use under the FDA EUA for laboratories certified under CLIA to perform moderate and/or high complexity testing. This test has not been FDA cleared or approved.  A negative test does not rule out the presence of PCR inhibitors in the specimen or target RNA in concentration below the limit of detection for the assay. The possibility of a false negative should be considered if the patient's recent exposure or clinical presentation suggests COVID-19.  [facility] are certified under the Clinical Laboratory Improvement Amendments of 1988 (CLIA-88) as qualified to perform moderate and/or high complexity laboratory testing.   CBC with platelets and differential     Status: Abnormal   Result Value Ref Range    WBC Count 4.7 4.0 - 11.0 10e3/uL    RBC Count 5.43 4.40 - 5.90 10e6/uL    Hemoglobin 16.9 13.3 - 17.7 g/dL    Hematocrit 47.1  40.0 - 53.0 %    MCV 87 78 - 100 fL    MCH 31.1 26.5 - 33.0 pg    MCHC 35.9 31.5 - 36.5 g/dL    RDW 12.8 10.0 - 15.0 %    Platelet Count 103 (L) 150 - 450 10e3/uL    % Neutrophils 48 %    % Lymphocytes 46 %    % Monocytes 4 %    % Eosinophils 0 %    % Basophils 1 %    % Immature Granulocytes 1 %    NRBCs per 100 WBC 0 <1 /100    Absolute Neutrophils 2.3 1.6 - 8.3 10e3/uL    Absolute Lymphocytes 2.2 0.8 - 5.3 10e3/uL    Absolute Monocytes 0.2 0.0 - 1.3 10e3/uL    Absolute Eosinophils 0.0 0.0 - 0.7 10e3/uL    Absolute Basophils 0.0 0.0 - 0.2 10e3/uL    Absolute Immature Granulocytes 0.0 <=0.4 10e3/uL    Absolute NRBCs 0.0 10e3/uL   Alcohol breath test POCT     Status: Abnormal   Result Value Ref Range    Alcohol Breath Test 0.36 (A) 0.00 - 0.01   CBC with platelets differential     Status: Abnormal    Narrative    The following orders were created for panel order CBC with platelets differential.  Procedure                               Abnormality         Status                     ---------                               -----------         ------                     CBC with platelets and d...[[id number]]  Abnormal            Final result                 Please view results for these tests on the individual orders.     Medications   diazepam (VALIUM) tablet 5-20 mg (has no administration in time range)   folic acid (FOLVITE) tablet 1 mg (has no administration in time range)   thiamine (B-1) tablet 100 mg (has no administration in time range)   multivitamin w/minerals (THERA-VIT-M) tablet 1 tablet (has no administration in time range)   sodium chloride (PF) 0.9% PF flush 3 mL (has no administration in time range)   sodium chloride (PF) 0.9% PF flush 3 mL (has no administration in time range)        Assessments & Plan (with Medical Decision Making)   A 31-year-old male with a history of alcohol abuse and dependence who presents after binge drinking for more than a week and requesting detox from alcohol.  No history of  DTs or seizures, but he does become physically symptomatic when he attempts to stop drinking.  He has a small abrasion on his finger but no other significant injuries from his fall while intoxicated.  His initial blood alcohol level 0.36.  His initial heart rate 130, which improved while sobering in ED.  His labs show some thrombocytopenia which is consistent with chronic drinking.  He has mild hyperglycemia without anion gap acidosis, which is consistent with his known history of diabetes and can be managed by taking metformin.  He appears medically stable and appropriate for voluntary detox admission.  We will continue to monitor with MSSA protocol and treat as needed while in the ED.  I have reviewed the nursing notes. I have reviewed the findings, diagnosis, plan and need for follow up with the patient.    New Prescriptions    No medications on file       Final diagnoses:   Alcohol dependence with intoxication with complication (H)     I, Nancy Smith, am serving as a trained medical scribe to document services personally performed by Ward Smith MD, based on the provider's statements to me.   IWard MD, was physically present and have reviewed and verified the accuracy of this note documented by Nancy Smith.   --  Ward Smith MD  [facility] EMERGENCY DEPARTMENT  8/17/2022     Ward Smith MD  08/17/22 [id number]

## 2022-08-17 NOTE — ED TRIAGE NOTES
Pt here with family for drug/alcohol treatment.  Pt obviously altered in triage, parents state this has happened before.      Triage Assessment     Row Name 08/17/22 [id number]       Triage Assessment (Adult)    Airway WDL WDL       Respiratory WDL    Respiratory WDL WDL       Skin Circulation/Temperature WDL    Skin Circulation/Temperature WDL WDL       Cardiac WDL    Cardiac WDL WDL       Peripheral/Neurovascular WDL    Peripheral Neurovascular WDL WDL       Cognitive/Neuro/Behavioral WDL    Cognitive/Neuro/Behavioral WDL WDL

## 2022-08-18 PROBLEM — F10.229 ALCOHOL DEPENDENCE WITH INTOXICATION WITH COMPLICATION (H): Status: ACTIVE | Noted: 2022-08-18

## 2022-08-18 LAB
GLUCOSE BLDC GLUCOMTR-MCNC: 306 MG/DL (ref 70–99)
GLUCOSE BLDC GLUCOMTR-MCNC: 321 MG/DL (ref 70–99)
GLUCOSE BLDC GLUCOMTR-MCNC: 324 MG/DL (ref 70–99)
GLUCOSE BLDC GLUCOMTR-MCNC: 343 MG/DL (ref 70–99)
GLUCOSE BLDC GLUCOMTR-MCNC: 366 MG/DL (ref 70–99)
GLUCOSE BLDC GLUCOMTR-MCNC: 400 MG/DL (ref 70–99)
HBA1C MFR BLD: 8.9 % (ref 0–5.6)

## 2022-08-18 PROCEDURE — 250N000012 HC RX MED GY IP 250 OP 636 PS 637: Performed by: INTERNAL MEDICINE

## 2022-08-18 PROCEDURE — 250N000013 HC RX MED GY IP 250 OP 250 PS 637: Performed by: PSYCHIATRY & NEUROLOGY

## 2022-08-18 PROCEDURE — 99223 1ST HOSP IP/OBS HIGH 75: CPT | Mod: AI | Performed by: PSYCHIATRY & NEUROLOGY

## 2022-08-18 PROCEDURE — 99232 SBSQ HOSP IP/OBS MODERATE 35: CPT | Performed by: STUDENT IN AN ORGANIZED HEALTH CARE EDUCATION/TRAINING PROGRAM

## 2022-08-18 PROCEDURE — 250N000013 HC RX MED GY IP 250 OP 250 PS 637: Performed by: FAMILY MEDICINE

## 2022-08-18 PROCEDURE — 128N000004 HC R&B CD ADULT

## 2022-08-18 PROCEDURE — 250N000012 HC RX MED GY IP 250 OP 636 PS 637: Performed by: STUDENT IN AN ORGANIZED HEALTH CARE EDUCATION/TRAINING PROGRAM

## 2022-08-18 RX ORDER — HYDROXYZINE HYDROCHLORIDE 25 MG/1
25 TABLET, FILM COATED ORAL EVERY 4 HOURS PRN
Status: DISCONTINUED | OUTPATIENT
Start: 2022-08-18 | End: 2022-08-19 | Stop reason: HOSPADM

## 2022-08-18 RX ORDER — METFORMIN HCL 500 MG
2000 TABLET, EXTENDED RELEASE 24 HR ORAL
Status: DISCONTINUED | OUTPATIENT
Start: 2022-08-18 | End: 2022-08-19 | Stop reason: HOSPADM

## 2022-08-18 RX ORDER — DEXTROSE MONOHYDRATE 25 G/50ML
25-50 INJECTION, SOLUTION INTRAVENOUS
Status: DISCONTINUED | OUTPATIENT
Start: 2022-08-18 | End: 2022-08-19 | Stop reason: HOSPADM

## 2022-08-18 RX ORDER — MAGNESIUM HYDROXIDE/ALUMINUM HYDROXICE/SIMETHICONE 120; 1200; 1200 MG/30ML; MG/30ML; MG/30ML
30 SUSPENSION ORAL EVERY 4 HOURS PRN
Status: DISCONTINUED | OUTPATIENT
Start: 2022-08-18 | End: 2022-08-19 | Stop reason: HOSPADM

## 2022-08-18 RX ORDER — FOLIC ACID 1 MG/1
1 TABLET ORAL DAILY
Status: DISCONTINUED | OUTPATIENT
Start: 2022-08-18 | End: 2022-08-19 | Stop reason: HOSPADM

## 2022-08-18 RX ORDER — AMOXICILLIN 250 MG
1 CAPSULE ORAL 2 TIMES DAILY PRN
Status: DISCONTINUED | OUTPATIENT
Start: 2022-08-18 | End: 2022-08-19 | Stop reason: HOSPADM

## 2022-08-18 RX ORDER — TRAZODONE HYDROCHLORIDE 50 MG/1
50 TABLET, FILM COATED ORAL
Status: DISCONTINUED | OUTPATIENT
Start: 2022-08-18 | End: 2022-08-19 | Stop reason: HOSPADM

## 2022-08-18 RX ORDER — NICOTINE 21 MG/24HR
1 PATCH, TRANSDERMAL 24 HOURS TRANSDERMAL DAILY
Status: DISCONTINUED | OUTPATIENT
Start: 2022-08-18 | End: 2022-08-19 | Stop reason: HOSPADM

## 2022-08-18 RX ORDER — DIAZEPAM 5 MG
5-20 TABLET ORAL EVERY 30 MIN PRN
Status: DISCONTINUED | OUTPATIENT
Start: 2022-08-18 | End: 2022-08-19 | Stop reason: HOSPADM

## 2022-08-18 RX ORDER — ATENOLOL 50 MG/1
50 TABLET ORAL DAILY PRN
Status: DISCONTINUED | OUTPATIENT
Start: 2022-08-18 | End: 2022-08-19 | Stop reason: HOSPADM

## 2022-08-18 RX ORDER — MULTIPLE VITAMINS W/ MINERALS TAB 9MG-400MCG
1 TAB ORAL DAILY
Status: DISCONTINUED | OUTPATIENT
Start: 2022-08-18 | End: 2022-08-19 | Stop reason: HOSPADM

## 2022-08-18 RX ORDER — NICOTINE POLACRILEX 4 MG
15-30 LOZENGE BUCCAL
Status: DISCONTINUED | OUTPATIENT
Start: 2022-08-18 | End: 2022-08-19 | Stop reason: HOSPADM

## 2022-08-18 RX ORDER — ESCITALOPRAM OXALATE 10 MG/1
10 TABLET ORAL DAILY
Status: DISCONTINUED | OUTPATIENT
Start: 2022-08-18 | End: 2022-08-19 | Stop reason: HOSPADM

## 2022-08-18 RX ADMIN — DIAZEPAM 10 MG: 5 TABLET ORAL at 05:17

## 2022-08-18 RX ADMIN — DIAZEPAM 10 MG: 5 TABLET ORAL at 16:44

## 2022-08-18 RX ADMIN — DIAZEPAM 10 MG: 5 TABLET ORAL at 08:54

## 2022-08-18 RX ADMIN — FOLIC ACID 1 MG: 1 TABLET ORAL at 08:54

## 2022-08-18 RX ADMIN — ESCITALOPRAM OXALATE 10 MG: 10 TABLET ORAL at 12:59

## 2022-08-18 RX ADMIN — DIAZEPAM 10 MG: 5 TABLET ORAL at 12:59

## 2022-08-18 RX ADMIN — INSULIN GLARGINE 12 UNITS: 100 INJECTION, SOLUTION SUBCUTANEOUS at 12:58

## 2022-08-18 RX ADMIN — TRAZODONE HYDROCHLORIDE 50 MG: 50 TABLET ORAL at 21:26

## 2022-08-18 RX ADMIN — MULTIPLE VITAMINS W/ MINERALS TAB 1 TABLET: TAB at 08:55

## 2022-08-18 RX ADMIN — INSULIN ASPART 5 UNITS: 100 INJECTION, SOLUTION INTRAVENOUS; SUBCUTANEOUS at 21:26

## 2022-08-18 RX ADMIN — DIAZEPAM 10 MG: 5 TABLET ORAL at 20:11

## 2022-08-18 RX ADMIN — DIAZEPAM 10 MG: 5 TABLET ORAL at 00:49

## 2022-08-18 RX ADMIN — THIAMINE HCL TAB 100 MG 100 MG: 100 TAB at 08:55

## 2022-08-18 RX ADMIN — HYDROXYZINE HYDROCHLORIDE 25 MG: 25 TABLET, FILM COATED ORAL at 22:03

## 2022-08-18 ASSESSMENT — ACTIVITIES OF DAILY LIVING (ADL)
DRESS: INDEPENDENT;SCRUBS (BEHAVIORAL HEALTH)
ADLS_ACUITY_SCORE: 28
ADLS_ACUITY_SCORE: 28
ORAL_HYGIENE: INDEPENDENT
TOILETING_ISSUES: NO
DIFFICULTY_EATING/SWALLOWING: NO
ADLS_ACUITY_SCORE: 28
DRESSING/BATHING_DIFFICULTY: NO
ADLS_ACUITY_SCORE: 35
ADLS_ACUITY_SCORE: 28
CONCENTRATING,_REMEMBERING_OR_MAKING_DECISIONS_DIFFICULTY: NO
ADLS_ACUITY_SCORE: 28
ADLS_ACUITY_SCORE: 28
DOING_ERRANDS_INDEPENDENTLY_DIFFICULTY: NO
ADLS_ACUITY_SCORE: 41
LAUNDRY: UNABLE TO COMPLETE
ADLS_ACUITY_SCORE: 28
DIFFICULTY_COMMUNICATING: NO
FALL_HISTORY_WITHIN_LAST_SIX_MONTHS: NO
WEAR_GLASSES_OR_BLIND: NO
HYGIENE/GROOMING: INDEPENDENT
CHANGE_IN_FUNCTIONAL_STATUS_SINCE_ONSET_OF_CURRENT_ILLNESS/INJURY: NO
WALKING_OR_CLIMBING_STAIRS_DIFFICULTY: NO

## 2022-08-18 NOTE — CONSULTS
[facility]  Internal Medicine Consult     Bridget Carmichael MRN# [id number]   Age: 31 year old YOB: 1990     Date of Admission: 8/17/2022  Date of Consult: 8/18/2022    Primary Care Provider: No Ref-Primary, Physician    Requesting Service: Behavioral Health - Leah Sims MD  Reason for Consult: General Medical Evaluation      SUBJECTIVE   CC:   Alcohol withdrawal    Assessment and Plan/Recommendations:     # Alcohol withdrawal, hx of alcohol use disorder   MSSA 9 this shift.  Denies hx of withdrawal seizures.  Pt reports drinking 7-8 shots daily. Blood EtOH on arrival was 0.36.   - Continue MSSA   - Folvite, multi-vites, thiamine supplementation   - Further management per Psychiatry       # Type 2 Diabetes   Per PCP note on 3/31 pt was diagnosed with T2DM in 2020 and had a normal C-Peptide at that time, ruling out Type 1. He is on both insulin and metformin at home but has not taken the Lantuss for some time. A1C in the ED was 8.9%.   - Hold metformin for now given withdrawal state and risk for lactic acidosis. May restart in coming days    - Restart home Lantuss at reduced dose of 20-> 12 units as he has not taken it for some time. Will start insulin this am Likely will need to increase in coming days    - Medium resistance sliding scale   - Accuchecks QID with meals and at bedtime plus 0200 BG check for safety   - Hypoglycemia protocol  - Patient should be seen by diabetes educator and endocrine as an outpatient   - Offered diabetic educator and endocrine consult but pt would prefer to do this as an outpatient   - I have ordered outpatient referrals for Endocrinology and DM education     # Tobacco Abuse   - Nicotine replacement     Medicine will continue to follow       Navin Sun PA-C  Internal Medicine [facility] Hospitalist  Page job code [phone] (3B), [phone] (3A), or [phone] ([facility] and 4A)  Text paging via [employer] is appreciated  August 18, 2022         HPI:   Bridget SAUCEDO  "Jany is a 31 year old male with PMHx significant for alcohol abuse, type 2 DM on metformin and insulin, past hx of lactic acidosis. He presented to [facility] seeking EtOH detox. Patient has hx of withdrawal but has never had withdrawal seizures. In the ED, pt's Blood EtOH was up to 0.36. He was hyperglycemic to 375. CBC and CMP were unremarkable. COVID negative. He was admitted to detox unit for alcohol withdrawal.    On my discussion with the pt he reports feeling better than yesterday but still tremulous. Reports some chills, diaphoresis, and nausea. Denies fevers or abdominal pain. Confirms being on metformin and lantuss at home. Reports not taking either for just 3 days PTA. Denies being on any other medications      Past Medical History:     Past Medical History:   Diagnosis Date     Anxiety      Diabetes (H)         Reviewed and updated in [employer].     Past Surgical History:    No past surgical history on file.      Social History:     Social History     Tobacco Use     Smoking status: Never Smoker   Substance Use Topics     Alcohol use: Yes     Alcohol/week: 42.0 standard drinks     Types: 42 Shots of liquor per week     Comment: 6-7 shots of liquor a day as of 12/4/2020     Drug use: No        Family History:     Family History   Problem Relation Age of Onset     Diabetes Mother          Allergies:   No Known Allergies      Medications:   Reviewed. Please see MAR     Review of Systems:   10 point ROS of systems including Constitutional, Eyes, Respiratory, Cardiovascular, Gastroenterology, Genitourinary, Integumentary, Muscularskeletal, Psychiatric were all negative except for pertinent positives noted in my HPI.    OBJECTIVE   Physical Exam:   Vitals were reviewed  Blood pressure (!) 127/91, pulse 99, temperature 97.7  F (36.5  C), temperature source Temporal, resp. rate 16, height 1.727 m (5' 8\"), weight 62.1 kg (136 lb 14.5 oz), SpO2 99 %.  General: Alert and oriented x3. Somewhat tremulous    HEENT: " Anicteric sclera, MMM  Cardiovascular: RRR, S1S2. No murmur noted  Lungs: CTAB without wheezing or crackles   GI: Abdomen soft, non-tender without rebound or guarding. + Bowel sounds.  Vascular: No peripheral edema, distal pulses palpable  Neurologic: No focal deficits, CN II-XII grossly intact  Skin: No jaundice, rashes, or lesions        Data:        Lab Results   Component Value Date     08/17/2022     03/26/2017    Lab Results   Component Value Date    CHLORIDE 103 08/17/2022    CHLORIDE 105 03/26/2017    Lab Results   Component Value Date    BUN 8 08/17/2022    BUN 15 03/26/2017      Lab Results   Component Value Date    POTASSIUM 3.7 08/17/2022    POTASSIUM 3.5 03/26/2017    Lab Results   Component Value Date    CO2 27 08/17/2022    CO2 24 03/26/2017    Lab Results   Component Value Date    CR 0.52 08/17/2022    CR 0.76 03/26/2017        Lab Results   Component Value Date    WBC 4.7 08/17/2022    HGB 16.9 08/17/2022    HCT 47.1 08/17/2022    MCV 87 08/17/2022     (L) 08/17/2022     Lab Results   Component Value Date    WBC 4.7 08/17/2022

## 2022-08-18 NOTE — PLAN OF CARE
"  Problem: Alcohol Withdrawal  Goal: Alcohol Withdrawal Symptom Control  Outcome: Ongoing, Progressing   Goal Outcome Evaluation:     SBAR    S = Situation:    Bridget Carmichael is a 31 year old year old male with a chief complaint of Alcohol Intoxication.   B  = Background:   Patient has a history of Diabetes type 2. Pt. Denied history of withdrawal seizures or Dts.         A  =  Assessment:   Patient came in alert and oriented X 4, speech was clear and coherent, and gait was balanced and steady. At admission, pt. Blood glucose was elevated at 366, internal Medicine was consulted by phone and orders were placed for glucose correction. Blood glucose after an hour was 343. There were no hyperglycemic symptoms notice or reported. Patient denied SI, SIB, HI, and AV/H. MSSA score at admission was 6.      Vital Signs: /88 (BP Location: Left arm)   Pulse 98   Temp 97.5  F (36.4  C) (Temporal)   Resp 18   Ht 1.727 m (5' 8\")   Wt 62.1 kg (136 lb 14.5 oz)   SpO2 95%   BMI 20.82 kg/m    R =   Request or Recommendation:     Continue to monitor patient for withdrawal symptoms and hyperglycemia.           "

## 2022-08-18 NOTE — DISCHARGE INSTRUCTIONS
Behavioral Discharge Planning and Instructions  THANK YOU FOR CHOOSING [address]  [phone]    Summary: You were admitted to [facility] on 8/18/22 for detoxification from alcohol.  A medical exam was performed that included lab work. You have met with a  and opted to attend outpatient treatment at [facility] and individual therapy at [facility].  Please take care and make your recovery a daily priority, Natnael!  It was a pleasure working with you and the entire treatment team here wishes you the very best in your recovery!     Recommendation:  outpatient treatment and individual therapy    Main Diagnoses:  Per Dr. Leah Sims MD;  303.90 (F10.20) Alcohol Use Disorder Severe    Major Treatments, Procedures and Findings:  You were treated for alcohol detoxification using [facility] protocol. You had a chemical dependency assessment. You had labs drawn and those results were reviewed with you. Please take a copy of your lab work with you to your next primary care provider appointment.    Symptoms to Report:  If you experience more anxiety, confusion, sleeplessness, deep sadness or thoughts of suicide, notify your treatment team or notify your primary care provider. IF ANY OF THE SYMPTOMS YOU ARE EXPERIENCING ARE A MEDICAL EMERGENCY CALL 911 IMMEDIATELY.     Lifestyle Adjustment: Adjust your lifestyle to get enough sleep, relaxation, exercise and good nutrition. Continue to develop healthy coping skills to decrease stress and promote a sober living environment. Do not use mood altering substances including alcohol, illegal drugs or addictive medications other than what is currently prescribed.     Disposition: Home    Facts about COVID19 at www.cdc.gov/COVID19 and www.[place].gov/covid19    Keeping hands clean is one of the most important steps we can take to avoid getting sick and spreading germs to others.  Please wash your hands frequently and lather with soap for at  least 20 seconds!    Medical Follow-Up:    [facility]  [address] [place] [address]  [phone]  Monday--Friday  8 a.m.--5 p.m.  Saturday--Sunday  Closed  Please make a follow up appointment within one week of discharge.    Treatment Follow-Up: Referrals in place for;    [facility]    [address], [place] [address]   [phone]  *Please call the above number to schedule admission    [facility]  [address] [place] [address]   [phone]    Therapy Follow-Up:  [facility]  Appointment:   [address] [facility] [address] [id number] [phone]    Recovery apps for your phone to locate current in person and zoom recovery meetings  [place] - meeting bri  AA  - meeting bri  Meeting guide - meeting bri  Quick NA meeting - meeting bri  Prachi- has various apps    Resources:  due to covid-19 most AA/NA meetings are being held online however some are in-person or a hybrid combination please check online to verify*  AA meetings search for them at: https://aa-intergroup.org (worldwide meeting listings)  AA meetings for [place] area can be found online at: https://[place].org (click local online meetings listings)  NA meetings for [place] area can be found online at: https://www.nam[place].org  (click find a meeting)  AA and NA Sponsors are excellent resources for support and you can find one at any support group meeting.   Alcoholics Anonymous (https://aa.org/): for information 24 hours/day  AA Intergroup service office in [place] (http://www.aastpa[place].org/) [phone]  AA Intergroup service office in [facility]: [phone]. (http://www.a[place].org/)  Narcotics Anonymous (www.nam[place].org) (294) 884-1499  https://aafairviewriverside.org/meetings  SMART Recovery - self management for addiction recovery:  www.[employer] Materialsrecovery.org  Pathways ~ A Health Crisis Resource & Support Center:   248.648.8505.  https://prescribetoprevent.org/patient-education/videos/  http://www.harmreduction.org  [facility] [phone]  Support Group:  AA/NA and Sponsor/support.  National [place] on Mental Illness (www.[place].francie.org): 875.476.8551 or 843-686-2919.  Alcoholics Anonymous (www.alcoholics-anonymous.org): Check your phone book for your local chapter.  Suicide Awareness Voices of Education (SAVE) (www.save.org): 825-987-XSHM (0874)  National Suicide Prevention Line (www.mentalhealthmn.org): 325-070-TBVE (2475)  Mental Health Consumer/Survivor Network of [place] (www.mhcsn.net): 755.668.4304 or 100-128-6636  Mental Health Association of [place] (www.mentalhealth.org): 409.781.5535 or 219-196-0407   Substance Abuse and Mental Health Services (www.samhsa.gov)  [place] Opioid Prevention Coalition: www.opioidcoalition.org    [place] Recovery Connection ([facility])  [facility] connects people seeking recovery to resources that help foster and sustain long-term recovery.  Whether you are seeking resources for treatment, transportation, housing, job training, education, health care or other pathways to recovery, [facility] is a great place to start.  600.602.1060.  www.[place]LendPro.Newspepper    Great Pod casts for nutrition and wellness  Listen on Apple Podcasts  Dishing [employer] Nutrition   [employer]   Nutrition       Understand the connection between what you eat and how you feel. Hosted by licensed nutritionists and dietitians from [employer] we share practical, real-life solutions for healthier living through nutrition.     General Medication Instructions:   See your medication sheet(s) for instructions.   Take all medications as prescribed.  Make no changes unless your primary care provider suggests them.   Go to all your primary care provider visits.  Be sure to have all your required lab tests. This way, your medicines can be refilled on time.  Do not use any forms of alcohol.    Please  Note:  If you have any questions at anytime after you are discharged please call [facility] detox unit [facility] at [phone].  [facility], Behavioral Intake [phone]  Medical Records call [phone]  Outpatient Behavioral Intake call [phone]  LP+ Wait List/Bed Availability call [phone]    Please remember to take all of your behavioral discharge planning and lab paperwork to any follow up appointments, it contains your lab results, diagnosis, medication list and discharge recommendations.      THANK YOU FOR CHOOSING [facility]

## 2022-08-18 NOTE — H&P
Bridget Carmichael is a 31 year old male    History was provided by PATEINT who was a fair  historian.   CHIEF COMPLAINT:  Drinking most of the day yesterday    HISTORY OF PRESENT ILLNESS:      SHIVANI Carimchael is a 31 year old male with a past medical history including alcohol use disorder, acute alcoholic intoxication with complication, lactic acidosis, DM2, hyperglycemia who presents to the Emergency Department for evaluation of alcohol intoxication.  Patient was brought in by his mother because he has been drinking for many days in a row now.  He states that he has been drinking consistently each day and that he also takes metformin for diabetes.  His mother states he was brought into [place] twice and that this is the third time he has been intoxicated that she has had to bring him in.  He states that he fell in the elevator.  He does state that he has a history of withdrawal but denies history of withdrawal seizures or hallucinations.  He has blood on his shirt and face from a scratch on his right hand.  Denies back or abdominal pain.       During my interview with the patient   Patient reports yesterday he was intoxicated and he fell and his family was concerned and brought him to the emergency room.  He was hospitalized in June for uncontrolled diabetes and for alcoholism in [place]  He did not stay sober after that he continued to drink.  Patient has been using the following substances: alcohol  Started at age19 , became a problem at 2016, he lost his brother to an opiate overdose  He drinks 7-8 shots /day       Patient has tolerance, withdrawal, progressive use, loss of control, spending more time and more amount than intended. Patient has made attempts to quit, is experiencing cravings, and reports negative consequences.             alcohol        USE DISORDER - CRITERIA  +admits to taking larger amounts than initially intended  +admits to unsuccessful efforts to cut down or control use  +admits to  spending a great deal of time in activities necessary to obtain, use and recover from  +admits to cravings or a strong desire to use   + admits to failure to fulfill obligations at work, school or home  + admits to continued use despite negative consequences  + admits to giving up important activities to use  +admits to use in situations in which it is physically dangerous        Patient does not have a history of seizures.  Patient does not have a history of delirium tremens.             Denies thoughts of suicide or harming others.      Denies auditory or visual hallucinations.     Patient  Quit smoking     Patient denied any gambling    Substance Age first use First became regular or problematic Most recent use            Cannabis      Cocaine NONE       Stimulants NONE       Opioids NONE       Sedatives NONE       Hallucinogens NONE       Inhalants NONE       Other         OTC drugs NONE       Nicotine         Patient does not have a history of overdose.  Patient does not have a history of IV use.  Patient does not have a history of hepatitis, HIV,      PSYCHIATRIC REVIEW OF SYSTEMS:         Psychiatric Review of Systems:   Depression:      Denies: depressed mood, suicidal ideation, decreased interest, changes in sleep, changes in appetite, guilt, hopelessness, helplessness, impaired concentration, decreased energy, irritability.  Mary Lou:       Denies: sleeplessness, increased goal-directed activities, abrupt increase in energy, pressured speech   impulsiveness, racing thoughts, increased goal-directed activities, pressured speech, increase in energy  Mary Lou Feeling euphoric,Distractible,Impulsive,Grandiose,Talking excessively,Have energy without sleeping,Mood swings,Irritability  Psychosis:     Denies: visual hallucinations, auditory hallucinations, paranoia  Anxiety:   Reports: excessive worries that are difficult to control for the past 6 months,   Chronic anxiety , not able to stop worrying impacting sleep,  poor conc, irritable , muscle tension fatigue    denies any Panic attacks  Pt has following s/o of anxiety  Palpitation pounding heart trembling shaking shortness of breath feeling of choking chest pain nausea feeling dizzy chills numbness derealization fear of dying fear of losing control    Come out of the blue    Denies: worries that are difficult to control for the past 6 months, panic attacks    Social anxiety disorder  reports : Marked anxiety about 1 or more social situations in which patient is exposed to scrutiny ofothers and patient fears patient will act in the way that patient that will be negatively  causes significant impairment  Patient is afraid of being judged scrutinized  Patient avoids social situations  PTSD:  Denies: re-experiencing past trauma, nightmares, increased arousal, avoidance of traumatic stimuli, impaired function.  OCD:   denies obsessions, patient has compulsions checking, counting symmetry, cleaning, skin picking.    ED:     Denies: restriction, binging, purging.         patient denies :symptoms of attention deficit disorder include a failure to pay attention to detail, a pattern of careless mistakes, a pattern of inattentive listening, a failure to follow through with projects, poor personal organization, losing necessary objects, distractibility, forgetfulness.       patient denies Symptoms of borderline personality disorder include a fear of abandonment, unstable self-image, impulsive behavior, affective instability due to marked reactivity and mood lasting hours dissociative feeling, intense anger, unstable personal relationships, chronic feelings of boredom, periods of intense depressed mood.  Transient stressed related paranoid ideation severe dissociative symptoms              PSYCHIATRIC HISTORY     Previous diagnoses:         Past court commitments: none  SIB /SUICIDE ATTEMPTS NONE  Psych Hosp :none  Outpatient Programs none  Inpatient cd trt none  Out pt cd trt  none  Detoxes none  PAST PSYCH MED TRIALS       SOCIAL HISTORY                                                                         Pt had a good childhood, comes from an intact family , he has 12+1 of education     Patient is single  Patient has 0  children  /patient is unemployed  Patient's housing is lives in his house        Family History:   FAMILY HISTORY:   Family History   Problem Relation Age of Onset     Diabetes Mother      Family Mental Health History-  none    Substance Use Problems - present for brother who  form opioid overdose             PTA Medications:     Medications Prior to Admission   Medication Sig Dispense Refill Last Dose     LANTUS SOLOSTAR 100 UNIT/ML soln Inject 20 Units Subcutaneous At Bedtime   Past Week at Unknown time     metFORMIN (GLUCOPHAGE XR) 500 MG 24 hr tablet TAKE 4 TABLETS BY MOUTH ONCE DAILY   Past Week at Unknown time          Allergies:   No Known Allergies       Labs:     Recent Results (from the past 48 hour(s))   Alcohol breath test POCT    Collection Time: 22  2:32 PM   Result Value Ref Range    Alcohol Breath Test 0.36 (A) 0.00 - 0.01   Comprehensive metabolic panel    Collection Time: 22  3:45 PM   Result Value Ref Range    Sodium 141 133 - 144 mmol/L    Potassium 3.7 3.4 - 5.3 mmol/L    Chloride 103 94 - 109 mmol/L    Carbon Dioxide (CO2) 27 20 - 32 mmol/L    Anion Gap 11 3 - 14 mmol/L    Urea Nitrogen 8 7 - 30 mg/dL    Creatinine 0.52 (L) 0.66 - 1.25 mg/dL    Calcium 9.4 8.5 - 10.1 mg/dL    Glucose 275 (H) 70 - 99 mg/dL    Alkaline Phosphatase 64 40 - 150 U/L    AST 19 0 - 45 U/L    ALT 33 0 - 70 U/L    Protein Total 8.6 6.8 - 8.8 g/dL    Albumin 4.4 3.4 - 5.0 g/dL    Bilirubin Total 0.4 0.2 - 1.3 mg/dL    GFR Estimate >90 >60 mL/min/1.73m2   Asymptomatic COVID-19 Virus (Coronavirus) by PCR Nasopharyngeal    Collection Time: 22  3:45 PM    Specimen: Nasopharyngeal; Swab   Result Value Ref Range    SARS CoV2 PCR Negative Negative   CBC with  "platelets and differential    Collection Time: 08/17/22  3:45 PM   Result Value Ref Range    WBC Count 4.7 4.0 - 11.0 10e3/uL    RBC Count 5.43 4.40 - 5.90 10e6/uL    Hemoglobin 16.9 13.3 - 17.7 g/dL    Hematocrit 47.1 40.0 - 53.0 %    MCV 87 78 - 100 fL    MCH 31.1 26.5 - 33.0 pg    MCHC 35.9 31.5 - 36.5 g/dL    RDW 12.8 10.0 - 15.0 %    Platelet Count 103 (L) 150 - 450 10e3/uL    % Neutrophils 48 %    % Lymphocytes 46 %    % Monocytes 4 %    % Eosinophils 0 %    % Basophils 1 %    % Immature Granulocytes 1 %    NRBCs per 100 WBC 0 <1 /100    Absolute Neutrophils 2.3 1.6 - 8.3 10e3/uL    Absolute Lymphocytes 2.2 0.8 - 5.3 10e3/uL    Absolute Monocytes 0.2 0.0 - 1.3 10e3/uL    Absolute Eosinophils 0.0 0.0 - 0.7 10e3/uL    Absolute Basophils 0.0 0.0 - 0.2 10e3/uL    Absolute Immature Granulocytes 0.0 <=0.4 10e3/uL    Absolute NRBCs 0.0 10e3/uL   Hemoglobin A1c    Collection Time: 08/17/22  3:45 PM   Result Value Ref Range    Hemoglobin A1C 8.9 (H) 0.0 - 5.6 %   Glucose by meter    Collection Time: 08/18/22  2:32 AM   Result Value Ref Range    GLUCOSE BY METER POCT 366 (H) 70 - 99 mg/dL   Glucose by meter    Collection Time: 08/18/22  5:09 AM   Result Value Ref Range    GLUCOSE BY METER POCT 343 (H) 70 - 99 mg/dL   Glucose by meter    Collection Time: 08/18/22  7:21 AM   Result Value Ref Range    GLUCOSE BY METER POCT 306 (H) 70 - 99 mg/dL         BP (!) 127/91   Pulse 99   Temp 97.7  F (36.5  C) (Temporal)   Resp 16   Ht 1.727 m (5' 8\")   Wt 62.1 kg (136 lb 14.5 oz)   SpO2 99%   BMI 20.82 kg/m    Weight is 136 lbs 14.49 oz  Body mass index is 20.82 kg/m .    Physical Exam:     ROS: 10 point ROS neg other than the symptoms noted above in the HPI.            Past Medical History:   PAST MEDICAL HISTORY:   Past Medical History:   Diagnosis Date     Anxiety      Diabetes (H)        PAST SURGICAL HISTORY: No past surgical history on file.    -    -           MENTAL STATUS EXAM:      Constitutional: General " appearance of patient:  Appearance:  awake, alert, appeared as age stated, adequate groomed and slightly unkempt  Attitude:  cooperative  Eye Contact:  good  Mood:  euthymic   Affect:  congruent   Speech:  clear, coherent normal rate   Psychomotor Behavior:  no evidence of tardive dyskinesia, dystonia, or tics  Thought Process:  logical, linear and goal oriented  Associations:  no loose associations  Thought Content:  no evidence of psychotic thought and active suicidal ideation present  Denied any active suicidal /homicidation ideation plan intent   Insight:  fair  Judgment:  fair  Oriented to:  time, person, and place  Attention Span and Concentration:  intact  Recent and Remote Memory:  intact  Language:  english with appropriate syntax and vocabulary  Fund of Knowledge: appropriate  Muscle Strength and Tone: normal  Gait and Station: Normal     There are no abnormal or psychotic thoughts, no preoccupations, no overvalued ideas, no rumination, no obsessions, no compulsions, no somatic concerns, no hypochrondriasis, no ideas of reference, and no delusions.  Patient denies homicidal thoughts.   Patient denies suicidal thoughts.  Patient appears to have good judgment and good insight.     Musculoskeletal: Patient shows no abnormalities of motor activity: there is no tremor, no tic, and no dystonia.  There is no apparent muscle atrophy, strength and tone appear normal, and there are no abnormal movements.  Patient has normal gait and stance.    DISCUSSION:         Assessment:       Patient has a biological predisposition with family history positive for opiod addiction   Psychologically patient is experiencing anxiety,alcoholism   Patient has these particular stressors job, money   Patient has chronic illness exacerbation leading to hospitalization progression as described.     Patient has been unable to stop using drugs in the community due to both physical and psychological symptoms.  Continued use will put the  patient at risk for medical and/or psychiatric complications.      Inpatient psychiatric hospitalization is warranted at this time for safety, stabilization, and possible adjustment in medications.       Diagnoses:    alcohol use dx severe  Alcohol withdrawal  Chava  Social anxiety dx          Plan:   Problem list  1# alcohol use dx severe  Alcohol withdrawal     - MSSA protocol using Valium for management of alcohol withdrawal    - Continue thiamine, folate, and multivitamin daily    MSSA    Eating Disturbances: 1  Tremor: 1 - not visibly apparent but can be felt by the examiner placing his fingertip slightly against the patient's fingertips  Sleep Disturbance: patient gets up once  Clouding of Sensorium: no evidence  Hallucinations: 0 - none  Quality of Contact: 0 - awareness of examiner and people around him/her  Agitation: 0 - normal activity  Paroxysmal Sweats: 2  Temperature: 99.5 or below  Pulse: 3 - 90 to 99  Total MSSA Score: 9  PT received 50 mg of diazepam in the ED and today morning he received 10 more milligrams total dose of 60 mg  2#Chava  Social anxiety dx  WILL START LEXAPRO 10 MG PO QD  3#patient is  he has hyperglycemia blood glucose is 3/30/1965 2043 report internal medicine consult        - Consider anti-craving medications prior to discharge. Pt willing to review additional information about both naltrexone and Antabuse.    Alcohol withdrawal nausea prn Zofran as needed for nausea     hydroxyzine 25 mg q4h prn for acute anxiety  Trazodone 50 mg at bedtime prn for sleep disturbances       Patient has been unable to stop using drugs in the community due to both physical and psychological symptoms.  Continued use will put the patient at risk for medical and/or psychiatric complications.    I HAVE REVIEWED LABS WITH PT AND TALKED ABOUT RESULTS WITH PT  I HAVE REVIEWED AND SUMMARIZED OLD RECORDS including his medication reconcilation of his home medications  and PDMP   I HAVE SPOKEN WITH RN ABOUT  MEDICATIONS AND DETOX SCORES  I HAVE SPOKEN WITH CM ABOUT PTS TREATMENT OPTIONS             Laboratory/Imaging:    Liver Function Studies -   Recent Labs   Lab Test 08/17/22  1545   PROTTOTAL 8.6   ALBUMIN 4.4   BILITOTAL 0.4   ALKPHOS 64   AST 19   ALT 33      Last Comprehensive Metabolic Panel:  Sodium   Date Value Ref Range Status   08/17/2022 141 133 - 144 mmol/L Final   03/26/2017 141 133 - 144 mmol/L Final     Potassium   Date Value Ref Range Status   08/17/2022 3.7 3.4 - 5.3 mmol/L Final   03/26/2017 3.5 3.4 - 5.3 mmol/L Final     Chloride   Date Value Ref Range Status   08/17/2022 103 94 - 109 mmol/L Final   03/26/2017 105 94 - 109 mmol/L Final     Carbon Dioxide   Date Value Ref Range Status   03/26/2017 24 20 - 32 mmol/L Final     Carbon Dioxide (CO2)   Date Value Ref Range Status   08/17/2022 27 20 - 32 mmol/L Final     Anion Gap   Date Value Ref Range Status   08/17/2022 11 3 - 14 mmol/L Final   03/26/2017 12 3 - 14 mmol/L Final     Glucose   Date Value Ref Range Status   08/17/2022 275 (H) 70 - 99 mg/dL Final   03/26/2017 116 (H) 70 - 99 mg/dL Final     GLUCOSE BY METER POCT   Date Value Ref Range Status   08/18/2022 306 (H) 70 - 99 mg/dL Final     Urea Nitrogen   Date Value Ref Range Status   08/17/2022 8 7 - 30 mg/dL Final   03/26/2017 15 7 - 30 mg/dL Final     Creatinine   Date Value Ref Range Status   08/17/2022 0.52 (L) 0.66 - 1.25 mg/dL Final   03/26/2017 0.76 0.66 - 1.25 mg/dL Final     GFR Estimate   Date Value Ref Range Status   08/17/2022 >90 >60 mL/min/1.73m2 Final     Comment:     Effective December 21, 2021 eGFRcr in adults is calculated using the 2021 CKD-EPI creatinine equation which includes age and gender (Abbe alicea al., NEJM, DOI: 10.1056/MWTKri0304255)   03/26/2017 >90  Non  GFR Calc   >60 mL/min/1.7m2 Final     Calcium   Date Value Ref Range Status   08/17/2022 9.4 8.5 - 10.1 mg/dL Final   03/26/2017 9.3 8.5 - 10.1 mg/dL Final     Bilirubin Total   Date Value Ref Range  "Status   08/17/2022 0.4 0.2 - 1.3 mg/dL Final   03/26/2017 0.8 0.2 - 1.3 mg/dL Final     Alkaline Phosphatase   Date Value Ref Range Status   08/17/2022 64 40 - 150 U/L Final   03/26/2017 71 40 - 150 U/L Final     ALT   Date Value Ref Range Status   08/17/2022 33 0 - 70 U/L Final   03/26/2017 58 0 - 70 U/L Final     AST   Date Value Ref Range Status   08/17/2022 19 0 - 45 U/L Final   03/26/2017 20 0 - 45 U/L Final                   Medical treatment/interventions:  Medical concerns: As above    - Consults: IM consult placed. Appreciate assistance.     Legal Status: Voluntary     Safety Assessment:   Checks: Status 15  Pt has not required locked seclusion or restraints in the past 24 hours to maintain safety, please refer to RN documentation for further details.    The risks, benefits, alternatives and side effects have been discussed and are understood by the patient.       Patient will be treated in therapeutic milieu with appropriate individual and group therapies as described.  Disposition: Pending clinical stabilization. Pt does  appear interested in COMPLETE DETOX AND DO TRT  Length of stay 3-5 days        \"Much or all of the text in this note was generated through the use of Dragon Dictate voice to text software. Errors in spelling or words which appear to be out of contact are unintentional, may be present due having escaped editing\"     "

## 2022-08-18 NOTE — PROGRESS NOTES
08/18/22 0149   Patient Belongings   Did you bring any home meds/supplements to the hospital?  No   Patient Belongings remains with patient;locker   Patient Belongings Remaining with Patient glasses   Patient Belongings Put in Hospital Secure Location (Security or Locker, etc.) cell phone/electronics;clothing;keys;wallet   Belongings Search Yes   Clothing Search Yes   Second Staff Bradley   Comment All of patient's belongings are in the wash, in his room and in his discharge bin. His important cards and cash of $1.00 are in a security envelop.     BIN:    WASH:  Some clothing with strings    DISCHARGE  BIN:  Phone, earphones, keys and wallet.    SECURITY ENV # [id number] - [place] Drivers License, 2 [employer] Cards, 3 VISA Cards, MasterCard, Cash $1.00    A               Admission:  I am responsible for any personal items that are not sent to the safe or pharmacy.  [facility] is not responsible for loss, theft or damage of any property in my possession.    Signature:  _________________________________ Date: _______  Time: _____                                              Staff Signature:  ____________________________ Date: ________  Time: _____      2nd Staff person, if patient is unable/unwilling to sign:    Signature: ________________________________ Date: ________  Time: _____     Discharge:  [facility] has returned all of my personal belongings:    Signature: _________________________________ Date: ________  Time: _____                                          Staff Signature:  ____________________________ Date: ________  Time: _____

## 2022-08-18 NOTE — PROGRESS NOTES
Crosscover brief note:  Paged for glucose 366. Patient with history of diabetes, has not been taking lantus for a long time. Will order medium intensity sliding scale correction and consider adding long-acting insulin pending results and A1C.    Parker John MD

## 2022-08-18 NOTE — PLAN OF CARE
Goal Outcome Evaluation:    Plan of Care Reviewed With: patient     Bridget has been up ad tiff, alternated between resting in his room and spending time in the milieu. Pt has been on the phone numerous times throughout the shift.     Pt was med adherent, cooperative with diabetic care. Pt's MSSA scores were 9 and 11, received PRN valium 10 mg x 2. Pt's blood glucose readings were 306 and 324, he has received Novolog per sliding scale x 2. Additionally, Pt restarted taking Lantus and received his daily dose at 12:58 pm.     Vanianeal denies having suicidal or self harm thoughts. Pt does endorse feeling quite anxious at times and depressed as well.     Pt has been able to eat and drink fluids, his appetite has been 50% of both meals.

## 2022-08-18 NOTE — PLAN OF CARE
Behavioral Team Discussion: (8/18/2022)    Continued Stay Criteria/Rationale: Patient admitted for Chemical Use Issues.  Plan: The following services will be provided to the patient; psychiatric assessment, medication management, therapeutic milieu, individual and group support, and skills groups.   Participants: 3A Provider: Dr. Leah Sims MD; 3A RN: Wisam Thomas RN; 3A CM's: Dalia Rader.  Summary/Recommendation: Providers will assess today for treatment recommendations, discharge planning, and aftercare plans. CM will meet with pt for discharge planning.   Medical/Physical: Per ED note:  past medical history including alcohol use disorder, acute alcoholic intoxication with complication, lactic acidosis, DM2, hyperglycemia  Precautions:   Behavioral Orders   Procedures     Code 1 - Restrict to Unit     Fall precautions     Routine Programming     As clinically indicated     Seizure precautions     Status 15     Every 15 minutes.     Withdrawal precautions     Rationale for change in precautions or plan: N/A  Progress: Initial     ASAM Dimension Scale Ratings:  Dimension 1: 3 Client tolerates and nava with withdrawal discomfort poorly. Client has severe intoxication, such that the client endangers self or others, or intoxication has not abated with less intensive levels of services. Client displays severe signs and symptoms; or risk of severe, but manageable withdrawal; or withdrawal worsening despite detox at less intensive level.  Dimension 2: 3 Client tolerates and nava poorly with physical problems or has poor general health. Client neglects medical problems without active assistance.  Dimension 3: 2 Client has difficulty with impulse control and lacks coping skills. Client has thoughts of suicide or harm to others without means; however, the thoughts may interfere with participation in some treatment activities. Client has difficulty functioning in significant life areas. Client has moderate  symptoms of emotional, behavioral, or cognitive problems. Client is able to participate in most treatment activities.  Dimension 4: 2 Client displays verbal compliance, but lacks consistent behaviors; has low motivation for change; and is passively involved in treatment.  Dimension 5: 4 No awareness of the negative impact of mental health problems or substance abuse. No coping skills to arrest mental health or addiction illnesses, or prevent relapse.  Dimension 6: 4 Client has (A) Chronically antagonistic significant other, living environment, family, peer group or long-term criminal justice involvement that is harmful to recovery or treatment progress, or (B) Client has an actively antagonistic significant other, family, work, or living environment with immediate threat to the client's safety and well-being.    PT SEEN   AGREE WITH ASSESSMENT AND PLAN

## 2022-08-19 ENCOUNTER — TELEPHONE (OUTPATIENT)
Dept: ADMINISTRATIVE | Facility: CLINIC | Age: 32
End: 2022-08-19

## 2022-08-19 VITALS
HEIGHT: 68 IN | OXYGEN SATURATION: 100 % | HEART RATE: 92 BPM | RESPIRATION RATE: 16 BRPM | DIASTOLIC BLOOD PRESSURE: 92 MMHG | WEIGHT: 136.91 LBS | SYSTOLIC BLOOD PRESSURE: 146 MMHG | BODY MASS INDEX: 20.75 KG/M2 | TEMPERATURE: 97.4 F

## 2022-08-19 LAB
CHOLEST SERPL-MCNC: 200 MG/DL
GGT SERPL-CCNC: 211 U/L (ref 0–75)
GLUCOSE BLDC GLUCOMTR-MCNC: 245 MG/DL (ref 70–99)
GLUCOSE BLDC GLUCOMTR-MCNC: 278 MG/DL (ref 70–99)
GLUCOSE BLDC GLUCOMTR-MCNC: 280 MG/DL (ref 70–99)
GLUCOSE BLDC GLUCOMTR-MCNC: 293 MG/DL (ref 70–99)
GLUCOSE BLDC GLUCOMTR-MCNC: 442 MG/DL (ref 70–99)
HBA1C MFR BLD: 9 % (ref 0–5.6)
HDLC SERPL-MCNC: 44 MG/DL
LDLC SERPL CALC-MCNC: ABNORMAL MG/DL
NONHDLC SERPL-MCNC: 156 MG/DL
TRIGL SERPL-MCNC: 524 MG/DL
TSH SERPL DL<=0.005 MIU/L-ACNC: 0.81 MU/L (ref 0.4–4)

## 2022-08-19 PROCEDURE — 250N000013 HC RX MED GY IP 250 OP 250 PS 637: Performed by: PSYCHIATRY & NEUROLOGY

## 2022-08-19 PROCEDURE — 80061 LIPID PANEL: CPT | Performed by: PSYCHIATRY & NEUROLOGY

## 2022-08-19 PROCEDURE — 82977 ASSAY OF GGT: CPT | Performed by: PSYCHIATRY & NEUROLOGY

## 2022-08-19 PROCEDURE — 99239 HOSP IP/OBS DSCHRG MGMT >30: CPT | Performed by: PSYCHIATRY & NEUROLOGY

## 2022-08-19 PROCEDURE — 83036 HEMOGLOBIN GLYCOSYLATED A1C: CPT | Performed by: PSYCHIATRY & NEUROLOGY

## 2022-08-19 PROCEDURE — 84443 ASSAY THYROID STIM HORMONE: CPT | Performed by: PSYCHIATRY & NEUROLOGY

## 2022-08-19 PROCEDURE — 250N000013 HC RX MED GY IP 250 OP 250 PS 637: Performed by: STUDENT IN AN ORGANIZED HEALTH CARE EDUCATION/TRAINING PROGRAM

## 2022-08-19 PROCEDURE — 36415 COLL VENOUS BLD VENIPUNCTURE: CPT | Performed by: PSYCHIATRY & NEUROLOGY

## 2022-08-19 PROCEDURE — H2032 ACTIVITY THERAPY, PER 15 MIN: HCPCS

## 2022-08-19 RX ORDER — ESCITALOPRAM OXALATE 10 MG/1
10 TABLET ORAL DAILY
Qty: 30 TABLET | Refills: 0 | Status: SHIPPED | OUTPATIENT
Start: 2022-08-20

## 2022-08-19 RX ORDER — NALTREXONE HYDROCHLORIDE 50 MG/1
50 TABLET, FILM COATED ORAL DAILY
Qty: 30 TABLET | Refills: 0 | Status: SHIPPED | OUTPATIENT
Start: 2022-08-26

## 2022-08-19 RX ORDER — HYDROXYZINE HYDROCHLORIDE 25 MG/1
25 TABLET, FILM COATED ORAL EVERY 4 HOURS PRN
Qty: 30 TABLET | Refills: 0 | Status: SHIPPED | OUTPATIENT
Start: 2022-08-19

## 2022-08-19 RX ORDER — INSULIN GLARGINE 100 [IU]/ML
12 INJECTION, SOLUTION SUBCUTANEOUS EVERY MORNING
Qty: 15 ML | Refills: 0 | Status: SHIPPED | OUTPATIENT
Start: 2022-08-20

## 2022-08-19 RX ORDER — MULTIPLE VITAMINS W/ MINERALS TAB 9MG-400MCG
1 TAB ORAL DAILY
Qty: 30 TABLET | Refills: 0 | Status: SHIPPED | OUTPATIENT
Start: 2022-08-20

## 2022-08-19 RX ORDER — FOLIC ACID 1 MG/1
1 TABLET ORAL DAILY
Qty: 30 TABLET | Refills: 0 | Status: SHIPPED | OUTPATIENT
Start: 2022-08-20

## 2022-08-19 RX ORDER — CHLORAL HYDRATE 500 MG
2 CAPSULE ORAL 2 TIMES DAILY
Status: DISCONTINUED | OUTPATIENT
Start: 2022-08-19 | End: 2022-08-19 | Stop reason: HOSPADM

## 2022-08-19 RX ORDER — PNV NO.95/FERROUS FUM/FOLIC AC 28MG-0.8MG
2 TABLET ORAL 2 TIMES DAILY
COMMUNITY
Start: 2022-08-19

## 2022-08-19 RX ORDER — NALTREXONE HYDROCHLORIDE 50 MG/1
50 TABLET, FILM COATED ORAL DAILY
Qty: 30 TABLET | Refills: 0 | Status: SHIPPED | OUTPATIENT
Start: 2022-08-19 | End: 2022-08-19

## 2022-08-19 RX ORDER — TRAZODONE HYDROCHLORIDE 50 MG/1
50 TABLET, FILM COATED ORAL
Qty: 30 TABLET | Refills: 0 | Status: SHIPPED | OUTPATIENT
Start: 2022-08-19

## 2022-08-19 RX ORDER — LANOLIN ALCOHOL/MO/W.PET/CERES
100 CREAM (GRAM) TOPICAL DAILY
Qty: 30 TABLET | Refills: 0 | Status: SHIPPED | OUTPATIENT
Start: 2022-08-20

## 2022-08-19 RX ADMIN — INSULIN ASPART 5 UNITS: 100 INJECTION, SOLUTION INTRAVENOUS; SUBCUTANEOUS at 20:50

## 2022-08-19 RX ADMIN — Medication 2 G: at 11:53

## 2022-08-19 RX ADMIN — Medication 2 G: at 20:20

## 2022-08-19 RX ADMIN — THIAMINE HCL TAB 100 MG 100 MG: 100 TAB at 08:48

## 2022-08-19 RX ADMIN — ESCITALOPRAM OXALATE 10 MG: 10 TABLET ORAL at 08:48

## 2022-08-19 RX ADMIN — FOLIC ACID 1 MG: 1 TABLET ORAL at 08:48

## 2022-08-19 RX ADMIN — INSULIN GLARGINE 12 UNITS: 100 INJECTION, SOLUTION SUBCUTANEOUS at 08:50

## 2022-08-19 RX ADMIN — MULTIPLE VITAMINS W/ MINERALS TAB 1 TABLET: TAB at 08:48

## 2022-08-19 ASSESSMENT — ACTIVITIES OF DAILY LIVING (ADL)
ADLS_ACUITY_SCORE: 28
ORAL_HYGIENE: INDEPENDENT
HYGIENE/GROOMING: INDEPENDENT
ADLS_ACUITY_SCORE: 28
LAUNDRY: UNABLE TO COMPLETE
ADLS_ACUITY_SCORE: 28
DRESS: INDEPENDENT
ORAL_HYGIENE: INDEPENDENT
ADLS_ACUITY_SCORE: 28
LAUNDRY: UNABLE TO COMPLETE
HYGIENE/GROOMING: INDEPENDENT
DRESS: INDEPENDENT;SCRUBS (BEHAVIORAL HEALTH)

## 2022-08-19 ASSESSMENT — ANXIETY QUESTIONNAIRES
1. FEELING NERVOUS, ANXIOUS, OR ON EDGE: MORE THAN HALF THE DAYS
5. BEING SO RESTLESS THAT IT IS HARD TO SIT STILL: SEVERAL DAYS
GAD7 TOTAL SCORE: 8
4. TROUBLE RELAXING: SEVERAL DAYS
7. FEELING AFRAID AS IF SOMETHING AWFUL MIGHT HAPPEN: SEVERAL DAYS
6. BECOMING EASILY ANNOYED OR IRRITABLE: SEVERAL DAYS
3. WORRYING TOO MUCH ABOUT DIFFERENT THINGS: SEVERAL DAYS
GAD7 TOTAL SCORE: 8
2. NOT BEING ABLE TO STOP OR CONTROL WORRYING: SEVERAL DAYS

## 2022-08-19 ASSESSMENT — PATIENT HEALTH QUESTIONNAIRE - PHQ9: SUM OF ALL RESPONSES TO PHQ QUESTIONS 1-9: 11

## 2022-08-19 NOTE — PLAN OF CARE
Problem: Alcohol Withdrawal  Goal: Alcohol Withdrawal Symptom Control  Outcome: Ongoing, Progressing   Goal Outcome Evaluation:    Plan of Care Reviewed With: patient        Pt visible for meals, GBS this shift  293, scheduled insulin and coverage given as ordered, ate >70% with good oral intake, VSS, MSSA score 4/3 no intervention required, pt calm and visible  in the lounge , denies SI/HI, will discharge home and follow up with [facility] for intake .  out of detox after 8pm. Rates anxiety 7, depression 3, states he is hopeful and futuristic, wants to start  Outpatient therapy and look for employment after treatment,   Contracts for safety.

## 2022-08-19 NOTE — PROGRESS NOTES
"  Unit 3A    UNIVERSAL ADULT DIAGNOSTIC ASSESSMENT - Substance Use Disorder    Provider Name and Credentials: Brianna Rader [facility]    PATIENT'S NAME: Bridget Carmichael  PREFERRED NAME: Vania  PRONOUNS: he/him/his     MRN: [id number]  : 1990   Last 4 SSN: [id number]  ACCT. NUMBER:  [id number]  DATE OF SERVICE: 2022   START TIME: 2022 at 8:00 am  END TIME: 2022 at 1:20 pm  PREFERRED PHONE: [phone]  EMAIL: [email or web address]   May we leave a program related message: Yes  SERVICE MODALITY:  In-person      Identifying Information:  Patient is a 31 year old, [place] male who was referred for an assessment by self. The pronoun use throughout this assessment reflects the patient's chosen pronoun. Patient attended the session alone.     Chief Complaint:   The reason for seeking services at this time is: \"managing substance abuse and anxiety withdrawals\"  The problem(s) began at age 19. Patient has not attempted to resolve these concerns in the past.  Patient is in active withdrawal, but is currently admitted to [facility] for medical detoxification and withdrawal monitoring and is not an imminent safety risk to self or others, and may proceed with the assessment interview    Social/Family History:  Patient reported he grew up in [place]. Patient was raised by biological parents. Patient reported that his childhood was \"ok, I had a decent upbringing as a young child\". Patient denies history of childhood abuse or neglect. Patient reports he has a younger brother who passed away in 2016 due to opiate overdose.  Patient describes current relationships with family of origin as positive.      The patient describes his cultural background as [place] in [place]  Cultural influences and impact on patient's life structure, values, norms, and healthcare: none identified.  Contextual influences on patient's health include: Individual Factors : substance abuse.  " "Patient identified his preferred language to be English. Patient reported he does not need the assistance of an  or other support involved in therapy.     Patient reported had no significant delays in developmental tasks.  Patient's highest education level was some college. Patient identified the following learning problems: none reported.  Patient reports he is able to understand written materials.    Patient's current relationship status is single.   Patient identified his sexual orientation as heterosexual.  Patient reported having one child(moses).     Patient's current living/housing situation involves staying with parents and he reports that housing is stable. Patient identified mother and girlfriend and cousin as part of his support system.  Patient identified the quality of these relationships as stable and meaningful.      Patient reports he is not involved in community of henry activities. Patients reports spirituality impacts his recovery in the following ways:  \"I believe in God and have henry\".     Patient reports engaging in the following recreational/leisure activities: movies, music, bowling, walking. Patient reports engaging in the following recreation/leisure activities while using: None. Patient reports the following people are supportive of recovery: mom, girlfriend, cousin.  Patient is currently unemployed.  Patient reports his income is obtained through parents.  Patient does identify finances as a current stressor. Cultural and socioeconomic factors do not affect the patient's access to services.    Patient denies substance related arrests or legal issues.  Patient denies being on probation / parole / under the jurisdiction of the court.    Patient's Strengths and Limitations:  Patient identified the following strengths or resources that will help him succeed in treatment: \"being present in my situation and having no second thoughts\". Things that may interfere with the patient's " success in treatment include: financial hardship.     Assessments:  The following assessments were completed by patient for this visit:  PHQ9:   PHQ-9 SCORE 8/19/2022   PHQ-9 Total Score 11     GAD7:   JEFF-7 SCORE 8/19/2022   Total Score 8     [place] Suicide Severity Rating Scale (Short Version)  [place] Suicide Severity Rating (Short Version) 12/4/2020 8/17/2022 8/18/2022   Over the past 2 weeks have you felt down, depressed, or hopeless? yes no -   Over the past 2 weeks have you had thoughts of killing yourself? no no -   Have you ever attempted to kill yourself? no no -   Q1 Wished to be Dead (Past Month) - - no   Q2 Suicidal Thoughts (Past Month) - - no   Q3 Suicidal Thought Method - - no     GAIN-sliding scale:  When was the last time that you had significant problems... 8/19/2022   with feeling very trapped, lonely, sad, blue, depressed or hopeless about the future? Past month   with sleep trouble, such as bad dreams, sleeping restlessly, or falling asleep during the day? Past Month   with feeling very anxious, nervous, tense, scared, panicked or like something bad was going to happen? 2 to 12 months ago   with becoming very distressed & upset when something reminded you of the past? Past month   with thinking about ending your life or committing suicide? Never      When was the last time that you did the following things 2 or more times? 8/19/2022   Lied or conned to get things you wanted or to avoid having to do something? 2 to 12 months ago   Had a hard time paying attention at school, work or home? 1+ years ago   Had a hard time listening to instructions at school, work or home? Never   Were a bully or threatened other people? Never   Started physical fights with other people? Never        Family Mental Health History:   Patient did not report a family history of mental health concerns.  Patient reports the following family history.      Patient reported the following previous mental health diagnoses:  "anxiety.  Patient reports his primary mental health symptoms include: \"not being able to relax, ruminating thoughts, feel uneasy, heart racing\" and these do impact his ability to function.   Patient has not received mental health services in the past.  Psychiatric Hospitalizations: None.  Patient denies a history of civil commitment.  Current mental health services/providers include: no current providers.    Patient has had a physical exam to rule out medical causes for current symptoms.  Date of last physical exam was within the past year. Client was encouraged to follow up with PCP if symptoms were to develop. The patient has a non-[facility] Primary Care Provider. Their PCP is at [facility] in [facility]. Patient reports the following current medical concerns: high blood sugars with diabetes.  Patient denies any issues with pain.  There are significant appetite / nutritional concerns / weight changes. Patient does not report a history of an eating disorder. Patient does not report a history of head injury / trauma / cognitive impairment.      Patient reports current meds as:   Outpatient Medications Marked as Taking for the 8/17/22 encounter (Hospital Encounter)   Medication Sig     [START ON 8/20/2022] escitalopram (LEXAPRO) 10 MG tablet Take 1 tablet (10 mg) by mouth daily     [START ON 8/20/2022] folic acid (FOLVITE) 1 MG tablet Take 1 tablet (1 mg) by mouth daily     hydrOXYzine (ATARAX) 25 MG tablet Take 1 tablet (25 mg) by mouth every 4 hours as needed for anxiety     [START ON 8/20/2022] insulin glargine (LANTUS SOLOSTAR) 100 UNIT/ML pen Inject 12 Units Subcutaneous every morning     metFORMIN (GLUCOPHAGE XR) 500 MG 24 hr tablet TAKE 4 TABLETS BY MOUTH ONCE DAILY     [START ON 8/20/2022] multivitamin w/minerals (THERA-VIT-M) tablet Take 1 tablet by mouth daily     naloxone (NARCAN) 4 MG/0.1ML nasal spray Spray 1 spray (4 mg) into one nostril alternating nostrils as needed every 2-3 minutes until " assistance arrives     [START ON 8/26/2022] naltrexone (DEPADE/REVIA) 50 MG tablet Take 1 tablet (50 mg) by mouth daily     Omega-3 Fatty Acids (FISH OIL OMEGA-3) 1000 MG CAPS Take 2 g by mouth 2 times daily     [START ON 8/20/2022] thiamine (B-1) 100 MG tablet Take 1 tablet (100 mg) by mouth daily     traZODone (DESYREL) 50 MG tablet Take 1 tablet (50 mg) by mouth nightly as needed for sleep (may repeat after 60 minutes)     Current Facility-Administered Medications   Medication     alum & mag hydroxide-simethicone (MAALOX) suspension 30 mL     atenolol (TENORMIN) tablet 50 mg     glucose gel 15-30 g    Or     dextrose 50 % injection 25-50 mL    Or     glucagon injection 1 mg     diazepam (VALIUM) tablet 5-20 mg     escitalopram (LEXAPRO) tablet 10 mg     fish oil-omega-3 fatty acids capsule 2 g     folic acid (FOLVITE) tablet 1 mg     hydrOXYzine (ATARAX) tablet 25 mg     insulin aspart (NovoLOG) injection (RAPID ACTING)     insulin aspart (NovoLOG) injection (RAPID ACTING)     insulin glargine (LANTUS PEN) injection 12 Units     [Held by provider] metFORMIN (GLUCOPHAGE XR) 24 hr tablet 2,000 mg     multivitamin w/minerals (THERA-VIT-M) tablet 1 tablet     nicotine (NICODERM CQ) 21 MG/24HR 24 hr patch 1 patch     nicotine (NICORETTE) gum 2 mg     nicotine Patch in Place     senna-docusate (SENOKOT-S/PERICOLACE) 8.6-50 MG per tablet 1 tablet     sodium chloride (PF) 0.9% PF flush 3 mL     thiamine (B-1) tablet 100 mg     traZODone (DESYREL) tablet 50 mg       Medication Adherence:  Patient reports taking prescribed medications as prescribed.    Patient Allergies:  No Known Allergies    Medical History:    Past Medical History:   Diagnosis Date     Anxiety      Diabetes (H)        Substance Use:  Patient reported the following biological family members or relatives with chemical health issues:  brother passed away, had addiction to Heroin and was laced with fentanyl.. Patient has not received substance use disorder  "and/or gambling treatment in the past. Patient has been to detox. Patient is not currently receiving any chemical dependency treatment. Patient reports no history of support group attendance.        Substance Age of first use Pattern and duration of use (include amounts and frequency) Date of last use     Withdrawal potential Route of administration   Has used Alcohol 18 Age 18: First Use  Age 23: 2-3 drinks/day  Current: 7-8 shots liquor/day 8/17/22 Yes oral   Has used Marijuana   16 Age 16: First Use  Age 18-20: Daily, 7-8 grams/day  Current: \"every blue moon\" 1 hit last week, no prior use for months 1 week prior No smoked     Has not used Amphetamines      NA  NA   Has not used Cocaine/ crack       NA  NA   Has not used Hallucinogens    NA  NA   Has not used Inhalants    NA  NA   Has not used Heroin    NA  NA   Has not used Other Opiates    NA  NA   Has not used Benzodiazepine      NA  NA   Has not used Barbiturates    NA  NA   Has not used Over the counter meds.    NA  NA   Has not used Caffeine    NA  NA   Has used Nicotine  19 Current: 1/2 pack/day 8/17/22 Yes smoked   Has not used other substances not listed above:  Identify:     NA  NA        Patient reported the following problems as a result of their substance use: family problems, financial problems and relationship problems.  Patient is concerned about substance use.     Patient reports experiencing the following withdrawal symptoms within the past 12 months: sweating, unable to sleep, muscle aches, nausea/vomiting and anxiety/worry and the following within the past 30 days: sweating, shaky/jittery/tremors, unable to sleep, agitation, fatigue, sad/depressed feeling, muscle aches, sensitivity to noise, high blood pressure, diarrhea, diminished appetite, unable to eat and anxiety/worry.   Patients reports urges to use Alcohol.  Patient reports he has used more Alcohol than intended and over a longer period of time than intended. Patient reports he has had " "unsuccessful attempts to cut down or control use of Alcohol.  Patient reports longest period of abstinence was 1 month \"cold turkey\" 2 years prior and return to use was due to \"cravings to drink\". Patient reports he has needed to use more Alcohol to achieve the same effect.  Patient does  report diminished effect with use of same amount of Alcohol.     Patient does  report a great deal of time is spent in activities necessary to obtain, use, or recover from Alcohol effects.  Patient does  report important social, occupational, or recreational activities are given up or reduced because of Alcohol use.  Alcohol use is continued despite knowledge of having a persistent or recurrent physical or psychological problem that is likely to have caused or exacerbated by use.  Patient reports the following problem behaviors while under the influence of substances: none identified.     Patient reports his recovery goals are \"I would like to learn ways to put the abuse to rest\".     Patient reports substance use has impacted his ability to function in a school setting. Patient reports substance use has not impacted his ability to function in a work setting.  Patients demographics and history impact his recovery in the following ways: none identified.     Patient does not have a history of gambling concerns and/or treatment. Patient does not have other addictive behaviors he is concerned about.         Significant Losses / Trauma / Abuse / Neglect Issues:   Patient did not serve in the .  There are indications or report of significant loss, trauma, abuse or neglect issues related to: death of brother and job loss in end of July 2022..  Concerns for possible neglect are not present.     Safety Assessment:   Current Safety Concerns:  [place] Suicide Severity Rating Scale (Short Version)  [place] Suicide Severity Rating (Short Version) 12/4/2020 8/17/2022 8/18/2022   Over the past 2 weeks have you felt down, depressed, or " hopeless? yes no -   Over the past 2 weeks have you had thoughts of killing yourself? no no -   Have you ever attempted to kill yourself? no no -   Q1 Wished to be Dead (Past Month) - - no   Q2 Suicidal Thoughts (Past Month) - - no   Q3 Suicidal Thought Method - - no     Patient denies current homicidal ideation and behaviors.  Patient denies current self-injurious ideation and behaviors.    Patient denied risk behaviors associated with substance use.  Patient denies any high risk behaviors associated with mental health symptoms.  Patient reports the following current concerns for their personal safety: None.  Patient reports there are not firearms in the house. There are no firearms in the home..     History of Safety Concerns:  Patient denied a history of homicidal ideation.     Patient denied a history of personal safety concerns.    Patient denied a history of assaultive behaviors.    Patient denied a history of sexual assault behaviors.     Patient denied a history of risk behaviors associated with substance use.  Patient denies any history of high risk behaviors associated with mental health symptoms.  Patient reports the following protective factors: dedication to family/friends, safe and stable environment, adherence with prescribed medication, living with other people, daily obligations, committment to well-being, healthy fear of risky behaviors or pain and pets    Risk Plan:  See Recommendations for Safety and Risk Management Plan    Review of Symptoms per patient report:  Substance Use:  blackouts, passing out, family relationship problems due to substance use, social problems related to substance use and cravings/urges to use     Collateral Contact Summary:   Collateral contacts contributing to this assessment: Epic chart review    If court related records were reviewed, summarize here: N/a    Information from collateral contacts supported/largely agreed with information from the client and associated  risk ratings.    Information in this assessment was obtained from the medical record and provided by patient who is a fair historian.    Patient will have open access to their mental health medical record.    Diagnostic Criteria: 1.) Alcohol/drug is often taken in larger amounts or over a longer period than was intended.  Met for Alcohol.  2.) There is a persistent desire or unsuccessful efforts to cut down or control alcohol/drug use.  Met for Alcohol.  3.) A great deal of time is spent in activities necessary to obtain alcohol, use alcohol, or recover from its effects.  Met for Alcohol.  4.) Craving, or a strong desire or urge to use alcohol/drug.  Met for Alcohol.  5.) Recurrent alcohol/drug use resulting in a failure to fulfill major role obligations at work, school or home.  Met for Alcohol.  9.) Alcohol/drug use is continued despite knowledge of having a persistent or recurrent physical or psychological problem that is likely to have been caused or exacerbated by alcohol.  Met for Alcohol.  10.) Tolerance, as defined by either of the following: A need for markedly increased amounts of alcohol/drug to achieve intoxication or desired effect..  Met for Alcohol.  11.) Withdrawal, as manifested by either of the following: The characteristic withdrawal syndrome for alcohol/drug (refer to Criteria A and B of the criteria set for alcohol/drug withdrawal).. Met for Alcohol.     As evidenced by self report and criteria, client meets the following DSM5 Diagnoses:   Alcohol Use Disorder-Severe  Tobacco Use Disorder- Severe    Recommendations:     1. Plan for Safety and Risk Management:  Recommended that patient call 911 or go to the local ED should there be a change in any of these risk factors..      Report to child / adult protection services was NA.     2. ANA LUISA Referrals:   Recommendations:  Outpatient Treatment.  Patient reports they are willing to follow these recommendations.     Patient would like the following  family or other support people involved in their treatment: parents. Patient does not have a history of opiate use.    3. Mental Health Referrals:  None made at this time     4. Patient identified no cultural concerns that need to be addressed in treatment.    5. Recommendations for treatment focus:   1)  Complete an Outpatient CD Treatment Program.   2)  Abstain from all mood-altering chemicals unless prescribed by a licensed provider.   3)  Attend weekly 12-step support group meetings.     4)  Actively work with a male sponsor or  through [facility] [phone] ([phone]).   5)  Follow all the recommendations of your treatment/medical providers.  6)  Patient may benefit from obtaining a full mental health evaluation.  7)  Patient could benefit from 1:1 psychotherapy due to past history of depression and anxiety       Clinical Substantiation:  Summary: Discussed with pt their desired outcome; reviewed living situation and community supports; reviewed type of use and risk factors for continued use. Risk ratings/justification below:   Dimension 1 -  Acute Intoxication/Withdrawal: 1 - Minor Problem Pt reports drug of choice is alcohol and last date of use is 8/17/22. Pt was admitted to [address] for sytmpomt of withdrawal. Symptoms are being managed and treated by  medical staff and should not interfere with treatment participation  Dimension 2 - Biomedical: 1 - Minor Problem Pt reports diagnosis of diabetes, states blood sugars typically run high. Reports his primary care physician recently left  his primary clinic, [facility] in [place]. Patient states he takes his medications as prescribed. Pt would benefit from establishing new primary care physician. Current medical concerns should not interfere treatment participation.  Dimension 3 - Emotional/Behavioral/Cognitive Conditions: 2 - Moderate Problem Pt reports he was raised by both parents in a good home. States  "childhood was good, denies history of abuse or neglect. Reports his brother passed away in 2016 of opiate overdose and pt has unresolved grief and loss. Reports history of anxiety, has not worked with mental health providers in the past. Pt denies current thoughts of suicide or self harm. Pt has difficulty with impulse control and lacks coping skills. Pt has difficulty functioning in significant life areas.  Dimension 4 - Readiness to Change:  1 - Minor Problem Pt appears moviated for change, requesting treatment, agreeable to referrals and resources. Pt appears in pre-contemplation stage of change.  Dimension 5 - Relapse/Continued Use/ Continued Problem Potential: 3 - Severe Problem Pt reports he has not been to treatment in the past, this is his first detox. States 1 month of sobirety in 2020 by going \"cold turkey\". Reports he relapsed due to cravings. Pt lacks insight into relapse triggers and cues for use, would benefit from developing peresonal relapse prevention plan.  Dimension 6 - Recovery Environment:  2 - Moderate Problem Pt lives at home with his parents. Reports family and girlfriend are supportive. Pt is unemployed, has no past criminal history. Pt reports drinking has increased due to recent unemployment.    Placement/Program/Barriers Identified: N/a    Referral:  [facility] Recovery Outpatient Treatment      DAANES Assessment ID: [id number]     Provider Name/ Credentials:  Brianna Rader [facility]  August 19, 2022        "

## 2022-08-19 NOTE — DISCHARGE SUMMARY
Bridget Carmichael MRN# [id number]   Age: 31 year old YOB: 1990     Date of Admission:  8/17/2022  Date of Discharge:  8/19/2022  Admitting Physician:  Leah Sims MD  Discharge Physician:  Leah Sims MD      DISCHARGE  DX  alcohol use dx severe  Alcohol withdrawal  Chava  Social anxiety dx              Event Leading to Hospitalization:     See Admission note by admitting provider for patient encounter. for additional details.          Hospital Course:   PATIENT was admitted to [facility] attending  under DR sims, please review the detailed admit note on 8/18/22   The patient was placed under status 15 (15 minute checks) to ensure patient safety.   MSSA protocol was initiated due to the patient's history of alcohol abuse and concern for withdrawal symptoms.  CBC, BMP and utox obtained.    All outpatient medications were continued    PATIENTdid participate in groups and was visible in the milieu.     The patient's symptoms of alcohol withdrawal  improved.     Patients energy motivation , sleep appetite improved.  Pt completed detox . It was un eventful.    lexapro added at 10 mg po every day for his anxiety  Discussed with patient medications for craving.  Spoke with patient about triggers coping skills relapse prevention.    CONSULTS DONE DURING PATIENTS HOSPITALIZATION.  Patient was seen by medicine on date8/18/22    This as per their medical consult    Assessment and Plan/Recommendations:      # Alcohol withdrawal, hx of alcohol use disorder   MSSA 9 this shift.  Denies hx of withdrawal seizures.  Pt reports drinking 7-8 shots daily. Blood EtOH on arrival was 0.36.   - Continue MSSA   - Folvite, multi-vites, thiamine supplementation   - Further management per Psychiatry         # Type 2 Diabetes   Per PCP note on 3/31 pt was diagnosed with T2DM in 2020 and had a normal C-Peptide at that time, ruling out Type 1. He is on both insulin and metformin at home but has not taken the  Lantuss for some time. A1C in the ED was 8.9%.   - Hold metformin for now given withdrawal state and risk for lactic acidosis. May restart in coming days    - Restart home Lantuss at reduced dose of 20-> 12 units as he has not taken it for some time. Will start insulin this am Likely will need to increase in coming days    - Medium resistance sliding scale   - Accuchecks QID with meals and at bedtime plus 0200 BG check for safety   - Hypoglycemia protocol  - Patient should be seen by diabetes educator and endocrine as an outpatient   - Offered diabetic educator and endocrine consult but pt would prefer to do this as an outpatient   - I have ordered outpatient referrals for Endocrinology and DM education      # Tobacco Abuse   - Nicotine replacement      Medicine will continue to follow       danilo 8/19/22  Assessment and plan:  Bridget Carmichael is a 31 year old male with PMHx significant for alcohol abuse, type 2 DM on metformin and insulin, past hx of lactic acidosis. He presented to [facility] seeking EtOH detox. Patient has hx of withdrawal but has never had withdrawal seizures. In the ED, pt's Blood EtOH was up to 0.36. He was hyperglycemic to 375. CBC and CMP were unremarkable. COVID negative. He was admitted to detox unit for alcohol withdrawal.        Case was discussed with psychiatry today. Psychiatry reports the plan is for the patient to discharge today, likely with outpt CD treatment. Medical recommendations as below.      # Type 2 Diabetes   Per PCP note on 3/31 pt was diagnosed with T2DM in 2020 and had a normal C-Peptide at that time, ruling out Type 1. He is on both insulin and metformin at home but has not taken the Lantuss for some time. A1C 9.0%.  - Resume home metformin dosing of 2000 mg XR daily  - Resume home Lantuss at 20 units daily   - Pt should see endocrinology and DM educators as outpatient due to uncontrolled DM (he declined these while inpatient). I have have ordered these  referrals         # Hypertriglyceridemia   Lipid panel concerning for hypercholesterolemia with triglycerides up to 524. Because triglycerides are > 400 LDL is incalculable. Elevated triglycerides in addition to DM Type 2 in a patient who is relatively thin (136 lbs) and does not fit the typical type 2 DM profile raises some questions about potential pancreatic insufficiency or familial hypertriglyceridemia   - Recommend further evaluation with by pt's PCP   - Recommend starting Fish oil tablets 2 grams 2x daily. I have ordered this on his discharge paperwork   - Pt may need to be started on a statin but I defer this to his PCP         Navin Sun PA-C  Internal Medicine [facility] Hospitalist  [facility]  Pager [phone]                            Pt was seen by cm  As per recommendations from cm    patient completed CD assessment for outpatient treatment referral to [facility] in [place]. Assessment was faxed to this program. Discussed with pt individual therapy which pt states he is interested in this. Pt had previously attempted therapy at [facility], states he did one appointment and then did not return. Pt reports he would like to try this program again and recognizes his need to also address his mental health. Pt provided with number of [facility], reports he will call to schedule individual therapy appointment. Pt is aware he will need to contact [facility] after discharge to schedule intake to program. AVS updated with contact information.      Update: Assessment also faxed to [facility] at pt's request. AVS updated.         Labs:reviewed with patient       Recent Results (from the past 48 hour(s))   Alcohol breath test POCT    Collection Time: 08/17/22  2:32 PM   Result Value Ref Range    Alcohol Breath Test 0.36 (A) 0.00 - 0.01   Comprehensive metabolic panel    Collection Time: 08/17/22  3:45 PM   Result Value Ref Range    Sodium 141 133 - 144  mmol/L    Potassium 3.7 3.4 - 5.3 mmol/L    Chloride 103 94 - 109 mmol/L    Carbon Dioxide (CO2) 27 20 - 32 mmol/L    Anion Gap 11 3 - 14 mmol/L    Urea Nitrogen 8 7 - 30 mg/dL    Creatinine 0.52 (L) 0.66 - 1.25 mg/dL    Calcium 9.4 8.5 - 10.1 mg/dL    Glucose 275 (H) 70 - 99 mg/dL    Alkaline Phosphatase 64 40 - 150 U/L    AST 19 0 - 45 U/L    ALT 33 0 - 70 U/L    Protein Total 8.6 6.8 - 8.8 g/dL    Albumin 4.4 3.4 - 5.0 g/dL    Bilirubin Total 0.4 0.2 - 1.3 mg/dL    GFR Estimate >90 >60 mL/min/1.73m2   Asymptomatic COVID-19 Virus (Coronavirus) by PCR Nasopharyngeal    Collection Time: 08/17/22  3:45 PM    Specimen: Nasopharyngeal; Swab   Result Value Ref Range    SARS CoV2 PCR Negative Negative   CBC with platelets and differential    Collection Time: 08/17/22  3:45 PM   Result Value Ref Range    WBC Count 4.7 4.0 - 11.0 10e3/uL    RBC Count 5.43 4.40 - 5.90 10e6/uL    Hemoglobin 16.9 13.3 - 17.7 g/dL    Hematocrit 47.1 40.0 - 53.0 %    MCV 87 78 - 100 fL    MCH 31.1 26.5 - 33.0 pg    MCHC 35.9 31.5 - 36.5 g/dL    RDW 12.8 10.0 - 15.0 %    Platelet Count 103 (L) 150 - 450 10e3/uL    % Neutrophils 48 %    % Lymphocytes 46 %    % Monocytes 4 %    % Eosinophils 0 %    % Basophils 1 %    % Immature Granulocytes 1 %    NRBCs per 100 WBC 0 <1 /100    Absolute Neutrophils 2.3 1.6 - 8.3 10e3/uL    Absolute Lymphocytes 2.2 0.8 - 5.3 10e3/uL    Absolute Monocytes 0.2 0.0 - 1.3 10e3/uL    Absolute Eosinophils 0.0 0.0 - 0.7 10e3/uL    Absolute Basophils 0.0 0.0 - 0.2 10e3/uL    Absolute Immature Granulocytes 0.0 <=0.4 10e3/uL    Absolute NRBCs 0.0 10e3/uL   Hemoglobin A1c    Collection Time: 08/17/22  3:45 PM   Result Value Ref Range    Hemoglobin A1C 8.9 (H) 0.0 - 5.6 %   Glucose by meter    Collection Time: 08/18/22  2:32 AM   Result Value Ref Range    GLUCOSE BY METER POCT 366 (H) 70 - 99 mg/dL   Glucose by meter    Collection Time: 08/18/22  5:09 AM   Result Value Ref Range    GLUCOSE BY METER POCT 343 (H) 70 - 99 mg/dL    Glucose by meter    Collection Time: 08/18/22  7:21 AM   Result Value Ref Range    GLUCOSE BY METER POCT 306 (H) 70 - 99 mg/dL   Glucose by meter    Collection Time: 08/18/22 11:40 AM   Result Value Ref Range    GLUCOSE BY METER POCT 324 (H) 70 - 99 mg/dL   Glucose by meter    Collection Time: 08/18/22  5:16 PM   Result Value Ref Range    GLUCOSE BY METER POCT 321 (H) 70 - 99 mg/dL   Glucose by meter    Collection Time: 08/18/22  9:22 PM   Result Value Ref Range    GLUCOSE BY METER POCT 400 (H) 70 - 99 mg/dL   Glucose by meter    Collection Time: 08/19/22  2:26 AM   Result Value Ref Range    GLUCOSE BY METER POCT 280 (H) 70 - 99 mg/dL   GGT    Collection Time: 08/19/22  6:43 AM   Result Value Ref Range     (H) 0 - 75 U/L   Hemoglobin A1c    Collection Time: 08/19/22  6:43 AM   Result Value Ref Range    Hemoglobin A1C 9.0 (H) 0.0 - 5.6 %   Lipid panel    Collection Time: 08/19/22  6:43 AM   Result Value Ref Range    Cholesterol 200 (H) <200 mg/dL    Triglycerides 524 (H) <150 mg/dL    Direct Measure HDL 44 >=40 mg/dL    LDL Cholesterol Calculated      Non HDL Cholesterol 156 (H) <130 mg/dL   TSH with free T4 reflex and/or T3 as indicated    Collection Time: 08/19/22  6:43 AM   Result Value Ref Range    TSH 0.81 0.40 - 4.00 mU/L   Glucose by meter    Collection Time: 08/19/22  7:14 AM   Result Value Ref Range    GLUCOSE BY METER POCT 293 (H) 70 - 99 mg/dL         Recent Results (from the past 240 hour(s))   Alcohol breath test POCT    Collection Time: 08/17/22  2:32 PM   Result Value Ref Range    Alcohol Breath Test 0.36 (A) 0.00 - 0.01   Comprehensive metabolic panel    Collection Time: 08/17/22  3:45 PM   Result Value Ref Range    Sodium 141 133 - 144 mmol/L    Potassium 3.7 3.4 - 5.3 mmol/L    Chloride 103 94 - 109 mmol/L    Carbon Dioxide (CO2) 27 20 - 32 mmol/L    Anion Gap 11 3 - 14 mmol/L    Urea Nitrogen 8 7 - 30 mg/dL    Creatinine 0.52 (L) 0.66 - 1.25 mg/dL    Calcium 9.4 8.5 - 10.1 mg/dL    Glucose  275 (H) 70 - 99 mg/dL    Alkaline Phosphatase 64 40 - 150 U/L    AST 19 0 - 45 U/L    ALT 33 0 - 70 U/L    Protein Total 8.6 6.8 - 8.8 g/dL    Albumin 4.4 3.4 - 5.0 g/dL    Bilirubin Total 0.4 0.2 - 1.3 mg/dL    GFR Estimate >90 >60 mL/min/1.73m2   Asymptomatic COVID-19 Virus (Coronavirus) by PCR Nasopharyngeal    Collection Time: 08/17/22  3:45 PM    Specimen: Nasopharyngeal; Swab   Result Value Ref Range    SARS CoV2 PCR Negative Negative   CBC with platelets and differential    Collection Time: 08/17/22  3:45 PM   Result Value Ref Range    WBC Count 4.7 4.0 - 11.0 10e3/uL    RBC Count 5.43 4.40 - 5.90 10e6/uL    Hemoglobin 16.9 13.3 - 17.7 g/dL    Hematocrit 47.1 40.0 - 53.0 %    MCV 87 78 - 100 fL    MCH 31.1 26.5 - 33.0 pg    MCHC 35.9 31.5 - 36.5 g/dL    RDW 12.8 10.0 - 15.0 %    Platelet Count 103 (L) 150 - 450 10e3/uL    % Neutrophils 48 %    % Lymphocytes 46 %    % Monocytes 4 %    % Eosinophils 0 %    % Basophils 1 %    % Immature Granulocytes 1 %    NRBCs per 100 WBC 0 <1 /100    Absolute Neutrophils 2.3 1.6 - 8.3 10e3/uL    Absolute Lymphocytes 2.2 0.8 - 5.3 10e3/uL    Absolute Monocytes 0.2 0.0 - 1.3 10e3/uL    Absolute Eosinophils 0.0 0.0 - 0.7 10e3/uL    Absolute Basophils 0.0 0.0 - 0.2 10e3/uL    Absolute Immature Granulocytes 0.0 <=0.4 10e3/uL    Absolute NRBCs 0.0 10e3/uL   Hemoglobin A1c    Collection Time: 08/17/22  3:45 PM   Result Value Ref Range    Hemoglobin A1C 8.9 (H) 0.0 - 5.6 %   Glucose by meter    Collection Time: 08/18/22  2:32 AM   Result Value Ref Range    GLUCOSE BY METER POCT 366 (H) 70 - 99 mg/dL   Glucose by meter    Collection Time: 08/18/22  5:09 AM   Result Value Ref Range    GLUCOSE BY METER POCT 343 (H) 70 - 99 mg/dL   Glucose by meter    Collection Time: 08/18/22  7:21 AM   Result Value Ref Range    GLUCOSE BY METER POCT 306 (H) 70 - 99 mg/dL   Glucose by meter    Collection Time: 08/18/22 11:40 AM   Result Value Ref Range    GLUCOSE BY METER POCT 324 (H) 70 - 99 mg/dL    Glucose by meter    Collection Time: 08/18/22  5:16 PM   Result Value Ref Range    GLUCOSE BY METER POCT 321 (H) 70 - 99 mg/dL   Glucose by meter    Collection Time: 08/18/22  9:22 PM   Result Value Ref Range    GLUCOSE BY METER POCT 400 (H) 70 - 99 mg/dL   Glucose by meter    Collection Time: 08/19/22  2:26 AM   Result Value Ref Range    GLUCOSE BY METER POCT 280 (H) 70 - 99 mg/dL   GGT    Collection Time: 08/19/22  6:43 AM   Result Value Ref Range     (H) 0 - 75 U/L   Hemoglobin A1c    Collection Time: 08/19/22  6:43 AM   Result Value Ref Range    Hemoglobin A1C 9.0 (H) 0.0 - 5.6 %   Lipid panel    Collection Time: 08/19/22  6:43 AM   Result Value Ref Range    Cholesterol 200 (H) <200 mg/dL    Triglycerides 524 (H) <150 mg/dL    Direct Measure HDL 44 >=40 mg/dL    LDL Cholesterol Calculated      Non HDL Cholesterol 156 (H) <130 mg/dL   TSH with free T4 reflex and/or T3 as indicated    Collection Time: 08/19/22  6:43 AM   Result Value Ref Range    TSH 0.81 0.40 - 4.00 mU/L   Glucose by meter    Collection Time: 08/19/22  7:14 AM   Result Value Ref Range    GLUCOSE BY METER POCT 293 (H) 70 - 99 mg/dL            Because this patient meets criteria for an Alcohol Use Disorder, I performed the following brief intervention on the date of this note:              1) Expressed concern that the patient is drinking at unhealthy levels known to increase their risk of alcohol related problems              2) Gave feedback linking alcohol use and health, including personalized feedback explaining how alcohol use can interact with their medical and/or psychiatric problems, and with prescribed medications.              3) Advised patient to abstain.    PT counseled on nicotine cessation and nicotine replacement provided    Discussed with patient many issues of addiction,triggers, relapse, and establishing a solid recovery program.    DISCHARGE MENTAL STATUS EXAMINATION:  The patient is alert, oriented x3.  Good fund of  knowledge.  Good use of language.  Recent and remote memory, language, fund of knowledge are all adequate.  Euthymic mood congruent affect  Speech normal rate/rhythm linear tp no loose asso,The patient does not have any active suicidal or homicidal ideation.  Does not have any auditory or visual hallucination.  Fair insight/judgment At this time, the patient was stable to be discharged.        Pt was not determined to not be a danger to himself or others. At the current time of discharge, the patient does not meet criteria for involuntary hospitalization. On the day of discharge, the patient reports that they do not have suicidal or homicidal ideation and would never hurt themselves or others. Steps taken to minimize risk include: assessing patient s behavior and thought process daily during hospital stay, discharging patient with adequate plan for follow up for mental and physical health and discussing safety plan of returning to the hospital should the patient ever have thoughts of harming themselves or others. Therefore, based on all available evidence including the factors cited above, the patient does not appear to be at imminent risk for self-harm, and is appropriate for outpatient level of care.     Educated about side effects/risk vs benefits /alternative including non treatment.Pt consented to be on medication.     .Total time spent on discharge summary more than 35 min  More than  20 min  planning, coordination of care, medication reconciliation and performance of physical exam on day of discharge.Care was coordinated with unit RN and unit therapist         Review of your medicines      START taking      Dose / Directions   escitalopram 10 MG tablet  Commonly known as: LEXAPRO  Used for: Type 2 diabetes mellitus without complication, without long-term current use of insulin (H)      Dose: 10 mg  Start taking on: August 20, 2022  Take 1 tablet (10 mg) by mouth daily  Quantity: 30 tablet  Refills: 0     Fish  Oil Omega-3 1000 MG Caps  Used for: Hypertriglyceridemia      Dose: 2 g  Take 2 g by mouth 2 times daily  Refills: 0     folic acid 1 MG tablet  Commonly known as: FOLVITE  Used for: Type 2 diabetes mellitus without complication, without long-term current use of insulin (H)      Dose: 1 mg  Start taking on: August 20, 2022  Take 1 tablet (1 mg) by mouth daily  Quantity: 30 tablet  Refills: 0     hydrOXYzine 25 MG tablet  Commonly known as: ATARAX  Used for: Type 2 diabetes mellitus without complication, without long-term current use of insulin (H)      Dose: 25 mg  Take 1 tablet (25 mg) by mouth every 4 hours as needed for anxiety  Quantity: 30 tablet  Refills: 0     multivitamin w/minerals tablet  Used for: Type 2 diabetes mellitus without complication, without long-term current use of insulin (H)      Dose: 1 tablet  Start taking on: August 20, 2022  Take 1 tablet by mouth daily  Quantity: 30 tablet  Refills: 0     naloxone 4 MG/0.1ML nasal spray  Commonly known as: NARCAN  Used for: Type 2 diabetes mellitus without complication, without long-term current use of insulin (H)      Dose: 4 mg  Spray 1 spray (4 mg) into one nostril alternating nostrils as needed every 2-3 minutes until assistance arrives  Quantity: 0.2 mL  Refills: 1     naltrexone 50 MG tablet  Commonly known as: DEPADE/REVIA  Used for: Type 2 diabetes mellitus without complication, without long-term current use of insulin (H)      Dose: 50 mg  Start taking on: August 26, 2022  Take 1 tablet (50 mg) by mouth daily  Quantity: 30 tablet  Refills: 0     thiamine 100 MG tablet  Commonly known as: B-1  Used for: Type 2 diabetes mellitus without complication, without long-term current use of insulin (H)      Dose: 100 mg  Start taking on: August 20, 2022  Take 1 tablet (100 mg) by mouth daily  Quantity: 30 tablet  Refills: 0     traZODone 50 MG tablet  Commonly known as: DESYREL      Dose: 50 mg  Take 1 tablet (50 mg) by mouth nightly as needed for sleep  (may repeat after 60 minutes)  Quantity: 30 tablet  Refills: 0        CONTINUE these medicines which may have CHANGED, or have new prescriptions. If we are uncertain of the size of tablets/capsules you have at home, strength may be listed as something that might have changed.      Dose / Directions   Lantus SoloStar 100 UNIT/ML pen  This may have changed:     how much to take    when to take this  Used for: Type 2 diabetes mellitus without complication, without long-term current use of insulin (H)  Generic drug: insulin glargine      Dose: 12 Units  Start taking on: August 20, 2022  Inject 12 Units Subcutaneous every morning  Quantity: 15 mL  Refills: 0        CONTINUE these medicines which have NOT CHANGED      Dose / Directions   metFORMIN 500 MG 24 hr tablet  Commonly known as: GLUCOPHAGE XR      TAKE 4 TABLETS BY MOUTH ONCE DAILY  Refills: 0           Where to get your medicines      These medications were sent to [facility] [place], [place] - [address]  [address] [address]    Phone: [phone]     escitalopram 10 MG tablet    folic acid 1 MG tablet    hydrOXYzine 25 MG tablet    Lantus SoloStar 100 UNIT/ML pen    multivitamin w/minerals tablet    naloxone 4 MG/0.1ML nasal spray    naltrexone 50 MG tablet    thiamine 100 MG tablet    traZODone 50 MG tablet     Some of these will need a paper prescription and others can be bought over the counter. Ask your nurse if you have questions.    You don't need a prescription for these medications    Fish Oil Omega-3 1000 MG Caps         Disposition: home     Facts about COVID19 at www.cdc.gov/COVID19 and www.[place].gov/covid19     Keeping hands clean is one of the most important steps we can take to avoid getting sick and spreading germs to others.  Please wash your hands frequently and lather with soap for at least 20 seconds!   Medical Follow-Up:     [facility]  [address] [place]  [address]  [phone]  Monday--Friday  8 a.m.--5 p.m.  Saturday--Sunday  Closed  Please make a follow up appointment within one week of discharge.     Treatment Follow-Up: Referrals in place for;     [facility]     [address], [place] [address]   [phone]  *Please call the above number to schedule admission     [facility]  [address] [place] [address]   [phone]     Therapy Follow-Up:  [facility]  Appointment:   [address] [id number] [phone]     Recovery apps for your phone to locate current in person and zoom recovery meetings  [place] - meeting bri  AA  - meeting bri  Meeting guide - meeting bri  Quick NA meeting - meeting bri  [employer]- has various apps     Resources:  due to covid-19 most AA/NA meetings are being held online however some are in-person or a hybrid combination please check online to verify*  AA meetings search for them at: https://aa-intergroup.org (worldwide meeting listings)  AA meetings for [place] area can be found online at: https://a[place].org (click local online meetings listings)  NA meetings for [place] area can be found online at: https://www.nam[place].org  (click find a meeting)  AA and NA Sponsors are excellent resources for support and you can find one at any support group meeting.   Alcoholics Anonymous (https://aa.org/): for information 24 hours/day  AA Intergroup service office in [place] (http://www.a[place].org/) [phone]  AA Intergroup service office in [facility]: [phone]. (http://www.a[place].org/)  Narcotics Anonymous (www.nam[place].org) (993) 196-8127  https://aafairviewriverside.org/meetings  SMART Recovery - self management for addiction recovery:  www.smartrecovery.org  Pathways ~ A Health Crisis Resource & Support Center:  488.635.5231.  https://prescribetoprevent.org/patient-education/videos/  http://www.harmreduction.org  [facility]  "[place] 950-303-5653  Support Group:  AA/NA and Sponsor/support.  National [place] on Mental Illness (www.[place].francie.org): 184.400.5246 or 409-464-8072.  Alcoholics Anonymous (www.alcoholics-anonymous.org): Check your phone book for your local chapter.  Suicide Awareness Voices of Education (SAVE) (www.save.org): 761-393-VAAV (6320)  National Suicide Prevention Line (www.mentalhealthmn.org): 611-518-AWSX (2366)  Mental Health Consumer/Survivor Network of [place] (www.mhcsn.net): 675.270.4240 or 272-553-3257  Mental Health Association of [place] (www.mentalhealth.org): 209.635.5763 or 752-593-7597   Substance Abuse and Mental Health Services (www.samhsa.gov)  [place] Opioid Prevention Coalition: www.opioidcoalition.org     .        \"Much or all of the text in this note was generated through the use of Dragon Dictate voice to text software. Errors in spelling or words which appear to be out of contact are unintentional, may be present due having escaped editing\"     "

## 2022-08-19 NOTE — PLAN OF CARE
Goal Outcome Evaluation:    Plan of Care Reviewed With: patient   Pt on withdrawal assessment. He was out on the unit, calm and cooperative. His MSSA score this shift was 11 and 10, he received valium 10 mg each time.  His blood sugar was 324 and 400 and covered with insulin per sliding scale. He was medication compliant and had adequate intake of both fluids and food. He was medicated with vistaril and trazodone prn at bedtime per his request. Continue to monitor and treat as needed.

## 2022-08-19 NOTE — PLAN OF CARE
Problem: Alcohol Withdrawal  Goal: Alcohol Withdrawal Symptom Control  Outcome: Ongoing, Progressing   Goal Outcome Evaluation:    The patient continues on MSSA for alcohol withdrawal. Scored 3 and 3. Pt. Slept through the night. The patient denied pain and all mental health symptoms.  The 15-minutes safety checks were carried out through this shift without incidence. Will continue to monitor the patient per plan of care.

## 2022-08-19 NOTE — TELEPHONE ENCOUNTER
Diabetes Education Scheduling Outreach #1:    Patient is currently inpatient. Sent [employer] message to call to schedule.    Mckayla Spangler  [facility]  Diabetes and Nutrition Scheduling

## 2022-08-19 NOTE — PROGRESS NOTES
Brief Hospital Progress Note      Assessment and plan:  Bridget Carmichael is a 31 year old male with PMHx significant for alcohol abuse, type 2 DM on metformin and insulin, past hx of lactic acidosis. He presented to [facility] seeking EtOH detox. Patient has hx of withdrawal but has never had withdrawal seizures. In the ED, pt's Blood EtOH was up to 0.36. He was hyperglycemic to 375. CBC and CMP were unremarkable. COVID negative. He was admitted to detox unit for alcohol withdrawal.      Case was discussed with psychiatry today. Psychiatry reports the plan is for the patient to discharge today, likely with outpt CD treatment. Medical recommendations as below.     # Type 2 Diabetes   Per PCP note on 3/31 pt was diagnosed with T2DM in 2020 and had a normal C-Peptide at that time, ruling out Type 1. He is on both insulin and metformin at home but has not taken the Lantuss for some time. A1C 9.0%.  - Resume home metformin dosing of 2000 mg XR daily  - Resume home Lantuss at 20 units daily   - Pt should see endocrinology and DM educators as outpatient due to uncontrolled DM (he declined these while inpatient). I have have ordered these referrals       # Hypertriglyceridemia   Lipid panel concerning for hypercholesterolemia with triglycerides up to 524. Because triglycerides are > 400 LDL is incalculable. Elevated triglycerides in addition to DM Type 2 in a patient who is relatively thin (136 lbs) and does not fit the typical type 2 DM profile raises some questions about potential pancreatic insufficiency or familial hypertriglyceridemia   - Recommend further evaluation with by pt's PCP   - Recommend starting Fish oil tablets 2 grams 2x daily. I have ordered this on his discharge paperwork   - Pt may need to be started on a statin but I defer this to his PCP       ANGELO Ramos-C  Internal Medicine [facility] [employer] [facility] Hospitalist  [facility]  Pager [phone]

## 2022-08-19 NOTE — PROGRESS NOTES
" 08/19/22 1700   Group Therapy Session   Time Session Began 1645   Time Session Ended 1730   Total Time patient participated (minutes) 45   Total # Attendees 3   Group Type expressive therapy   Group Topic Covered disease of addiction/choices in recovery   Group Session Detail Relapse reflections: music and writing   Patient Response/Contribution cooperative with task   Patient Response Detail \"Vania\" shared optimistically about this being his first attempt to stop, about family and spirituality being important to him.   He was quieter as session progressed, but calm and cooperative throughout. He did the most writing of anyone in group today, and was the first to share.        "

## 2022-08-20 NOTE — PLAN OF CARE
Goal Outcome Evaluation:    Plan of Care Reviewed With: patient     Pt pleasant visible  and engaged all shift, denies discomfort, rates anxiety 3, related to discharge process,  no depression, meds and paperwork reviewed with patient, MSSA score 5/4 this shift, no intervention required, will be discharging home with parents and seeking admission to [facility], denies SI/HI, GBS at meal time was 278 S/S insulin given, spot checked at snack time for GBS of 442, informed internal medicine, per Hospitalist orders to give S/S insulin.  5 units now. pt is now out of detox. Family to  this evening. Pt contracts for safety.

## 2022-09-11 ENCOUNTER — HEALTH MAINTENANCE LETTER (OUTPATIENT)
Age: 32
End: 2022-09-11

## 2023-01-22 ENCOUNTER — HEALTH MAINTENANCE LETTER (OUTPATIENT)
Age: 33
End: 2023-01-22

## 2023-04-30 ENCOUNTER — HEALTH MAINTENANCE LETTER (OUTPATIENT)
Age: 33
End: 2023-04-30

## 2023-10-01 ENCOUNTER — HEALTH MAINTENANCE LETTER (OUTPATIENT)
Age: 33
End: 2023-10-01

## 2024-02-18 ENCOUNTER — HEALTH MAINTENANCE LETTER (OUTPATIENT)
Age: 34
End: 2024-02-18

## 2024-07-07 ENCOUNTER — HEALTH MAINTENANCE LETTER (OUTPATIENT)
Age: 34
End: 2024-07-07

## 2024-11-24 ENCOUNTER — HEALTH MAINTENANCE LETTER (OUTPATIENT)
Age: 34
End: 2024-11-24

## 2025-03-09 ENCOUNTER — HEALTH MAINTENANCE LETTER (OUTPATIENT)
Age: 35
End: 2025-03-09

## 2025-06-28 ENCOUNTER — HEALTH MAINTENANCE LETTER (OUTPATIENT)
Age: 35
End: 2025-06-28

## 2025-07-19 ENCOUNTER — HEALTH MAINTENANCE LETTER (OUTPATIENT)
Age: 35
End: 2025-07-19

## 2025-08-18 ENCOUNTER — HOSPITAL ENCOUNTER (INPATIENT)
Facility: CLINIC | Age: 35
LOS: 3 days | Discharge: HOME | DRG: 897 | End: 2025-08-21
Attending: FAMILY MEDICINE | Admitting: PSYCHIATRY & NEUROLOGY
Payer: COMMERCIAL

## 2025-08-18 ENCOUNTER — TELEPHONE (OUTPATIENT)
Dept: BEHAVIORAL HEALTH | Facility: CLINIC | Age: 35
End: 2025-08-18

## 2025-08-18 PROBLEM — F10.939 ALCOHOL WITHDRAWAL SYNDROME WITH COMPLICATION (H): Status: ACTIVE | Noted: 2025-08-18

## 2025-08-18 ASSESSMENT — LIFESTYLE VARIABLES
NAUSEA AND VOMITING: NO NAUSEA AND NO VOMITING
ANXIETY: MILDLY ANXIOUS
NAUSEA AND VOMITING: NO NAUSEA AND NO VOMITING
TREMOR: NO TREMOR
VISUAL DISTURBANCES: NOT PRESENT
AUDITORY DISTURBANCES: NOT PRESENT
TOTAL SCORE: 2
AGITATION: NORMAL ACTIVITY
ORIENTATION AND CLOUDING OF SENSORIUM: CANNOT DO SERIAL ADDITIONS OR IS UNCERTAIN ABOUT DATE
HEADACHE, FULLNESS IN HEAD: NOT PRESENT
TOTAL SCORE: 1
HEADACHE, FULLNESS IN HEAD: NOT PRESENT
TREMOR: NO TREMOR
AUDITORY DISTURBANCES: NOT PRESENT
AGITATION: NORMAL ACTIVITY
ANXIETY: MILDLY ANXIOUS
ORIENTATION AND CLOUDING OF SENSORIUM: ORIENTED AND CAN DO SERIAL ADDITIONS
PAROXYSMAL SWEATS: NO SWEAT VISIBLE
VISUAL DISTURBANCES: NOT PRESENT
PAROXYSMAL SWEATS: NO SWEAT VISIBLE

## 2025-08-18 ASSESSMENT — ACTIVITIES OF DAILY LIVING (ADL)
ADLS_ACUITY_SCORE: 18
ADLS_ACUITY_SCORE: 41
HYGIENE/GROOMING: INDEPENDENT
ADLS_ACUITY_SCORE: 41
DRESS: INDEPENDENT
ADLS_ACUITY_SCORE: 41
ADLS_ACUITY_SCORE: 18
ADLS_ACUITY_SCORE: 41
ADLS_ACUITY_SCORE: 41
ORAL_HYGIENE: INDEPENDENT

## 2025-08-19 PROBLEM — F10.239 ALCOHOL DEPENDENCE WITH WITHDRAWAL WITH COMPLICATION (H): Status: ACTIVE | Noted: 2025-08-18

## 2025-08-19 ASSESSMENT — ACTIVITIES OF DAILY LIVING (ADL)
ADLS_ACUITY_SCORE: 18
ADLS_ACUITY_SCORE: 18
DRESS: INDEPENDENT
ADLS_ACUITY_SCORE: 18
ADLS_ACUITY_SCORE: 18
LAUNDRY: WITH SUPERVISION
ADLS_ACUITY_SCORE: 18
DRESS: SCRUBS (BEHAVIORAL HEALTH)
ADLS_ACUITY_SCORE: 18
HYGIENE/GROOMING: INDEPENDENT
ADLS_ACUITY_SCORE: 18
ORAL_HYGIENE: INDEPENDENT
ADLS_ACUITY_SCORE: 18
ADLS_ACUITY_SCORE: 18
ORAL_HYGIENE: INDEPENDENT
ADLS_ACUITY_SCORE: 18
HYGIENE/GROOMING: INDEPENDENT
ADLS_ACUITY_SCORE: 18

## 2025-08-20 ASSESSMENT — ACTIVITIES OF DAILY LIVING (ADL)
ADLS_ACUITY_SCORE: 18
HYGIENE/GROOMING: INDEPENDENT
ADLS_ACUITY_SCORE: 18
LAUNDRY: WITH SUPERVISION
ADLS_ACUITY_SCORE: 18
DRESS: INDEPENDENT
ADLS_ACUITY_SCORE: 18
ORAL_HYGIENE: INDEPENDENT
ADLS_ACUITY_SCORE: 18
DRESS: INDEPENDENT
ADLS_ACUITY_SCORE: 18
ORAL_HYGIENE: INDEPENDENT
HYGIENE/GROOMING: INDEPENDENT
ADLS_ACUITY_SCORE: 18

## 2025-08-21 ASSESSMENT — ACTIVITIES OF DAILY LIVING (ADL)
ADLS_ACUITY_SCORE: 18
LAUNDRY: WITH SUPERVISION
ADLS_ACUITY_SCORE: 18
HYGIENE/GROOMING: INDEPENDENT
ADLS_ACUITY_SCORE: 18
DRESS: SCRUBS (BEHAVIORAL HEALTH)
ORAL_HYGIENE: INDEPENDENT